# Patient Record
Sex: FEMALE | Race: WHITE | URBAN - METROPOLITAN AREA
[De-identification: names, ages, dates, MRNs, and addresses within clinical notes are randomized per-mention and may not be internally consistent; named-entity substitution may affect disease eponyms.]

---

## 2018-04-27 ENCOUNTER — HOSPITAL ENCOUNTER (EMERGENCY)
Age: 28
Discharge: HOME | End: 2018-04-27
Attending: EMERGENCY MEDICINE
Payer: MEDICAID

## 2018-04-27 VITALS
WEIGHT: 160 LBS | TEMPERATURE: 98.3 F | OXYGEN SATURATION: 100 % | BODY MASS INDEX: 25.71 KG/M2 | SYSTOLIC BLOOD PRESSURE: 97 MMHG | RESPIRATION RATE: 15 BRPM | DIASTOLIC BLOOD PRESSURE: 60 MMHG | HEIGHT: 66 IN | HEART RATE: 58 BPM

## 2018-04-27 DIAGNOSIS — R11.2 NAUSEA AND VOMITING, INTRACTABILITY OF VOMITING NOT SPECIFIED, UNSPECIFIED VOMITING TYPE: Primary | ICD-10-CM

## 2018-04-27 DIAGNOSIS — Z34.90 EARLY STAGE OF PREGNANCY: ICD-10-CM

## 2018-04-27 LAB
ALBUMIN SERPL-MCNC: 4.1 G/DL (ref 3.5–5)
ALBUMIN/GLOB SERPL: 1.1 {RATIO} (ref 1.2–3.5)
ALP SERPL-CCNC: 48 U/L (ref 50–136)
ALT SERPL-CCNC: 13 U/L (ref 12–65)
ANION GAP SERPL CALC-SCNC: 10 MMOL/L (ref 7–16)
AST SERPL-CCNC: 14 U/L (ref 15–37)
BASOPHILS # BLD: 0 K/UL (ref 0–0.2)
BASOPHILS NFR BLD: 0 % (ref 0–2)
BILIRUB SERPL-MCNC: 0.6 MG/DL (ref 0.2–1.1)
BUN SERPL-MCNC: 12 MG/DL (ref 6–23)
CALCIUM SERPL-MCNC: 9.2 MG/DL (ref 8.3–10.4)
CHLORIDE SERPL-SCNC: 99 MMOL/L (ref 98–107)
CO2 SERPL-SCNC: 25 MMOL/L (ref 21–32)
CREAT SERPL-MCNC: 0.63 MG/DL (ref 0.6–1)
DIFFERENTIAL METHOD BLD: NORMAL
EOSINOPHIL # BLD: 0.1 K/UL (ref 0–0.8)
EOSINOPHIL NFR BLD: 1 % (ref 0.5–7.8)
ERYTHROCYTE [DISTWIDTH] IN BLOOD BY AUTOMATED COUNT: 12.9 % (ref 11.9–14.6)
GLOBULIN SER CALC-MCNC: 3.9 G/DL (ref 2.3–3.5)
GLUCOSE SERPL-MCNC: 76 MG/DL (ref 65–100)
HCG UR QL: POSITIVE
HCT VFR BLD AUTO: 39.5 % (ref 35.8–46.3)
HGB BLD-MCNC: 13.4 G/DL (ref 11.7–15.4)
IMM GRANULOCYTES # BLD: 0 K/UL (ref 0–0.5)
IMM GRANULOCYTES NFR BLD AUTO: 0 % (ref 0–5)
LYMPHOCYTES # BLD: 1.7 K/UL (ref 0.5–4.6)
LYMPHOCYTES NFR BLD: 18 % (ref 13–44)
MCH RBC QN AUTO: 28.4 PG (ref 26.1–32.9)
MCHC RBC AUTO-ENTMCNC: 33.9 G/DL (ref 31.4–35)
MCV RBC AUTO: 83.7 FL (ref 79.6–97.8)
MONOCYTES # BLD: 0.5 K/UL (ref 0.1–1.3)
MONOCYTES NFR BLD: 5 % (ref 4–12)
NEUTS SEG # BLD: 7.2 K/UL (ref 1.7–8.2)
NEUTS SEG NFR BLD: 76 % (ref 43–78)
PLATELET # BLD AUTO: 233 K/UL (ref 150–450)
PMV BLD AUTO: 11 FL (ref 10.8–14.1)
POTASSIUM SERPL-SCNC: 3.7 MMOL/L (ref 3.5–5.1)
PROT SERPL-MCNC: 8 G/DL (ref 6.3–8.2)
RBC # BLD AUTO: 4.72 M/UL (ref 4.05–5.25)
SODIUM SERPL-SCNC: 134 MMOL/L (ref 136–145)
WBC # BLD AUTO: 9.4 K/UL (ref 4.3–11.1)

## 2018-04-27 PROCEDURE — 81025 URINE PREGNANCY TEST: CPT

## 2018-04-27 PROCEDURE — 80053 COMPREHEN METABOLIC PANEL: CPT | Performed by: EMERGENCY MEDICINE

## 2018-04-27 PROCEDURE — 81003 URINALYSIS AUTO W/O SCOPE: CPT | Performed by: EMERGENCY MEDICINE

## 2018-04-27 PROCEDURE — 99284 EMERGENCY DEPT VISIT MOD MDM: CPT | Performed by: EMERGENCY MEDICINE

## 2018-04-27 PROCEDURE — 96374 THER/PROPH/DIAG INJ IV PUSH: CPT | Performed by: EMERGENCY MEDICINE

## 2018-04-27 PROCEDURE — 74011250636 HC RX REV CODE- 250/636: Performed by: EMERGENCY MEDICINE

## 2018-04-27 PROCEDURE — 85025 COMPLETE CBC W/AUTO DIFF WBC: CPT | Performed by: EMERGENCY MEDICINE

## 2018-04-27 RX ORDER — SODIUM CHLORIDE 0.9 % (FLUSH) 0.9 %
5-10 SYRINGE (ML) INJECTION EVERY 8 HOURS
Status: DISCONTINUED | OUTPATIENT
Start: 2018-04-27 | End: 2018-04-28 | Stop reason: HOSPADM

## 2018-04-27 RX ORDER — ONDANSETRON 2 MG/ML
4 INJECTION INTRAMUSCULAR; INTRAVENOUS
Status: COMPLETED | OUTPATIENT
Start: 2018-04-27 | End: 2018-04-27

## 2018-04-27 RX ORDER — SODIUM CHLORIDE 9 MG/ML
1000 INJECTION, SOLUTION INTRAVENOUS ONCE
Status: COMPLETED | OUTPATIENT
Start: 2018-04-27 | End: 2018-04-27

## 2018-04-27 RX ORDER — ONDANSETRON 8 MG/1
8 TABLET, ORALLY DISINTEGRATING ORAL
Qty: 10 TAB | Refills: 0 | Status: SHIPPED | OUTPATIENT
Start: 2018-04-27 | End: 2018-05-17

## 2018-04-27 RX ORDER — SODIUM CHLORIDE 0.9 % (FLUSH) 0.9 %
5-10 SYRINGE (ML) INJECTION AS NEEDED
Status: DISCONTINUED | OUTPATIENT
Start: 2018-04-27 | End: 2018-04-28 | Stop reason: HOSPADM

## 2018-04-27 RX ADMIN — ONDANSETRON 4 MG: 2 INJECTION INTRAMUSCULAR; INTRAVENOUS at 20:10

## 2018-04-27 RX ADMIN — SODIUM CHLORIDE 1000 ML: 900 INJECTION, SOLUTION INTRAVENOUS at 20:10

## 2018-04-27 NOTE — ED TRIAGE NOTES
Patient states she is 8 weeks pregnant, and for the last 3 weeks she has been unable to keep most liquids down. States has been getting dizzy at work.

## 2018-04-28 NOTE — DISCHARGE INSTRUCTIONS
Extreme Nausea and Vomiting in Pregnancy: Care Instructions  Your Care Instructions    Nausea and vomiting (often called morning sickness) are common in pregnancy. They are caused by pregnancy hormones and happen most often in the first 3 months. Some women get very sick and are not able to keep down food and fluids. This extreme morning sickness is called hyperemesis gravidarum. It can lead to a dangerous loss of fluids in the body. It also can keep you from gaining weight and getting proper nutrition during your pregnancy. Your body fluids are put back in balance with water and minerals called electrolytes. Medicine may help if you have severe nausea and vomiting. Follow-up care is a key part of your treatment and safety. Be sure to make and go to all appointments, and call your doctor if you are having problems. It's also a good idea to know your test results and keep a list of the medicines you take. How can you care for yourself at home? · Take your medicines exactly as prescribed. Call your doctor if you think you are having a problem with your medicine. · Drink plenty of fluids to prevent dehydration. Choose water and other caffeine-free clear liquids until you feel better. Try sipping on sports drinks that have salt and sugar in them. · Eat a small snack, such as crackers, before you get out of bed. Wait a few minutes, then get out of bed slowly. · Keep food in your stomach, but not too much at once. An empty stomach can make nausea worse. Eat several small meals every day instead of three large meals. · Eat more protein and less fat. · Get plenty of vitamin B6 by eating whole grains, nuts, seeds, and legumes. You can take vitamin B6 tablets if your doctor says it is okay. · Try to avoid smells and foods that make you feel sick to your stomach. · Get lots of rest.  · You may want to try acupressure bands.  They put pressure on an acupressure point in the wrist. Some women feel better using the bands.  · Marina may also help you feel better. You can use it in tea, take it as a pill, or use a marina syrup that you can buy at a health food store. When should you call for help? Call 911 anytime you think you may need emergency care. For example, call if:  ? · You passed out (lost consciousness). ?Call your doctor now or seek immediate medical care if:  ? · You are sick to your stomach or cannot drink fluids. ? · You have symptoms of dehydration, such as:  ¨ Dry eyes and a dry mouth. ¨ Passing only a little urine. ¨ Feeling thirstier than usual.   ? · You are not able to keep down your medicines. ? · You have pain in your belly or pelvis. ? Watch closely for changes in your health, and be sure to contact your doctor if:  ? · You do not get better as expected. Where can you learn more? Go to http://evelyn-kaitlyn.info/. Enter P539 in the search box to learn more about \"Extreme Nausea and Vomiting in Pregnancy: Care Instructions. \"  Current as of: March 16, 2017  Content Version: 11.4  © 2406-7261 [employer]. Care instructions adapted under license by [employer] (which disclaims liability or warranty for this information). If you have questions about a medical condition or this instruction, always ask your healthcare professional. [place] [address] any warranty or liability for your use of this information.

## 2018-04-28 NOTE — ED NOTES
I have reviewed discharge instructions with the patient. The patient verbalized understanding. Patient left ED via Discharge Method: ambulatory to Home with spouse. Opportunity for questions and clarification provided. Patient given 1 scripts. To continue your aftercare when you leave the hospital, you may receive an automated call from our care team to check in on how you are doing. This is a free service and part of our promise to provide the best care and service to meet your aftercare needs.  If you have questions, or wish to unsubscribe from this service please call [phone]. Thank you for Choosing our [facility] Emergency Department.

## 2018-04-28 NOTE — ED NOTES
22 ga IV removed from left AC at time of discharge. Placed earlier by [facility] our tech.

## 2018-04-28 NOTE — ED PROVIDER NOTES
HPI Comments: 51-year-old white female  A0 with last menstrual period 3/2/18 presents with nausea and vomiting for the past 3 weeks. She does report some dizziness when she stands. Mild diarrhea. No vaginal bleeding or discharge. No abdominal pain. Patient is a 32 y.o. female presenting with pregnancy problem and vomiting. The history is provided by the patient. Pregnancy Problem    Associated symptoms include diarrhea, nausea and vomiting. Pertinent negatives include no fever, no dysuria, no headaches, no chest pain and no back pain. Vomiting    Associated symptoms include diarrhea. Pertinent negatives include no fever, no abdominal pain, no headaches, no cough and no headaches. Past Medical History:   Diagnosis Date    Anemia NEC        Past Surgical History:   Procedure Laterality Date    HX OTHER SURGICAL  2006    jaw surgery         History reviewed. No pertinent family history. Social History     Social History    Marital status: SINGLE     Spouse name: N/A    Number of children: N/A    Years of education: N/A     Occupational History    Not on file. Social History Main Topics    Smoking status: Never Smoker    Smokeless tobacco: Not on file    Alcohol use No    Drug use: No      Comment: hx marajuan use    Sexual activity: Yes     Partners: Male     Birth control/ protection: None     Other Topics Concern    Not on file     Social History Narrative         ALLERGIES: Codeine; Morphine; and Zyrtec [cetirizine]    Review of Systems   Constitutional: Negative for fever. HENT: Negative for congestion. Respiratory: Negative for cough and shortness of breath. Cardiovascular: Negative for chest pain. Gastrointestinal: Positive for diarrhea, nausea and vomiting. Negative for abdominal pain. Genitourinary: Negative for dysuria. Musculoskeletal: Negative for back pain and neck pain. Skin: Negative for rash. Neurological: Negative for headaches.        Vitals: 04/27/18 1916 04/27/18 1936   BP: 103/67 110/63   Pulse: 61 68   Resp: 18    Temp: 98.3 °F (36.8 °C)    SpO2: 100%    Weight: 72.6 kg (160 lb)    Height: 5' 6\" (1.676 m)             Physical Exam   Constitutional: She is oriented to person, place, and time. She appears well-developed and well-nourished. No distress. HENT:   Head: Normocephalic and atraumatic. Eyes: Conjunctivae are normal. Pupils are equal, round, and reactive to light. No scleral icterus. Neck: Normal range of motion. Neck supple. Cardiovascular: Normal rate and regular rhythm. No murmur heard. Pulmonary/Chest: Effort normal and breath sounds normal. She has no wheezes. Abdominal: Soft. She exhibits no distension. Musculoskeletal: Normal range of motion. She exhibits no edema. Neurological: She is alert and oriented to person, place, and time. Skin: Skin is warm and dry. Psychiatric: She has a normal mood and affect. Nursing note and vitals reviewed. MDM  Number of Diagnoses or Management Options  Diagnosis management comments: Blood work is unremarkable. Urinalysis has some ketones but no infection. Pregnancy is positive. Patient has been hydrated with normal saline and treated with Zofran. She is tolerating intake at this time.   She appears safe for discharge home and will be given OB/GYN referral.       Amount and/or Complexity of Data Reviewed  Clinical lab tests: ordered and reviewed  Tests in the medicine section of CPT®: ordered and reviewed    Risk of Complications, Morbidity, and/or Mortality  Presenting problems: moderate  Diagnostic procedures: moderate  Management options: moderate          ED Course       Procedures

## 2018-05-17 PROBLEM — Z34.81 MULTIGRAVIDA IN FIRST TRIMESTER: Status: ACTIVE | Noted: 2018-05-17

## 2018-05-17 PROBLEM — O34.219 PREVIOUS CESAREAN DELIVERY, ANTEPARTUM: Status: ACTIVE | Noted: 2018-05-17

## 2018-05-18 PROBLEM — O26.892 RH NEGATIVE STATUS DURING PREGNANCY IN SECOND TRIMESTER: Status: ACTIVE | Noted: 2018-05-18

## 2018-05-18 PROBLEM — Z67.91 RH NEGATIVE STATUS DURING PREGNANCY IN SECOND TRIMESTER: Status: ACTIVE | Noted: 2018-05-18

## 2018-06-02 ENCOUNTER — HOSPITAL ENCOUNTER (EMERGENCY)
Age: 28
Discharge: HOME | End: 2018-06-02
Attending: EMERGENCY MEDICINE
Payer: MEDICAID

## 2018-06-02 VITALS
HEART RATE: 64 BPM | OXYGEN SATURATION: 100 % | BODY MASS INDEX: 25.71 KG/M2 | WEIGHT: 160 LBS | SYSTOLIC BLOOD PRESSURE: 104 MMHG | HEIGHT: 66 IN | RESPIRATION RATE: 20 BRPM | TEMPERATURE: 98 F | DIASTOLIC BLOOD PRESSURE: 57 MMHG

## 2018-06-02 DIAGNOSIS — L02.211 ABSCESS OF ABDOMINAL WALL: Primary | ICD-10-CM

## 2018-06-02 PROCEDURE — 75810000289 HC I&D ABSCESS SIMP/COMP/MULT: Performed by: EMERGENCY MEDICINE

## 2018-06-02 PROCEDURE — 99283 EMERGENCY DEPT VISIT LOW MDM: CPT | Performed by: EMERGENCY MEDICINE

## 2018-06-02 RX ORDER — CEPHALEXIN 500 MG/1
500 CAPSULE ORAL 3 TIMES DAILY
Qty: 15 CAP | Refills: 0 | Status: SHIPPED | OUTPATIENT
Start: 2018-06-02 | End: 2018-07-02 | Stop reason: ALTCHOICE

## 2018-06-02 NOTE — ED PROVIDER NOTES
HPI Comments: Patient thinks she was bitten by an insect on the right lower quadrant and right inguinal region. This is a few days ago. Increasing swelling pain and redness. She is 14 weeks gestation. No abdominal pain vaginal bleeding or discharge. No vomiting or fever. Patient is a 32 y.o. female presenting with Insect Bite. The history is provided by the patient. Insect Bite   This is a new problem. The current episode started more than 2 days ago. The problem occurs constantly. The problem has been gradually worsening. Pertinent negatives include no chest pain and no abdominal pain. Nothing aggravates the symptoms. Nothing relieves the symptoms. Past Medical History:   Diagnosis Date    Anemia NEC        Past Surgical History:   Procedure Laterality Date     DELIVERY ONLY      HX BREAST AUGMENTATION      HX OTHER SURGICAL  2006    jaw surgery         Family History:   Problem Relation Age of Onset    No Known Problems Father     No Known Problems Mother        Social History     Social History    Marital status:      Spouse name: N/A    Number of children: N/A    Years of education: N/A     Occupational History    Not on file. Social History Main Topics    Smoking status: Never Smoker    Smokeless tobacco: Never Used    Alcohol use No    Drug use: No      Comment: hx marajuan use    Sexual activity: Yes     Partners: Male     Birth control/ protection: None     Other Topics Concern    Caffeine Concern No    Exercise No    Seat Belt Yes    Self-Exams No     Social History Narrative    Abuse: Feels safe at home, no history of physical abuse, no history of sexual abuse             ALLERGIES: Codeine; Morphine; and Zyrtec [cetirizine]    Review of Systems   Constitutional: Negative for chills and fever. Cardiovascular: Negative for chest pain. Gastrointestinal: Negative for abdominal pain and vomiting.        Vitals:    18 0026 18 0331 18 0332   BP: 109/58  104/57   Pulse: 75 64    Resp: 20     Temp: 98 °F (36.7 °C)     SpO2: 99% 100%    Weight: 72.6 kg (160 lb)     Height: 5' 6\" (1.676 m)              Physical Exam   Constitutional: She appears well-developed and well-nourished. No distress. Abdominal: Soft. There is no tenderness. Skin:        Nursing note and vitals reviewed. MDM  Number of Diagnoses or Management Options  Abscess of abdominal wall:   Diagnosis management comments: No imaging needed    Risk of Complications, Morbidity, and/or Mortality  Presenting problems: low  Diagnostic procedures: minimal          ED Course       I&D Abcess Simple  Date/Time: 6/2/2018 6:26 AM  Performed by: Kezia Archuleta  Authorized by: Kezia Archuleta     Location:     Type:  Abscess    Location:  Trunk    Trunk location:  Abdomen  Pre-procedure details:     Skin preparation:  Betadine  Anesthesia (see MAR for exact dosages): Anesthesia method:  Local infiltration    Local anesthetic:  Lidocaine 1% w/o epi  Procedure type:     Complexity:  Simple  Procedure details:     Needle aspiration: yes      Incision types:  Single straight    Incision depth:  Subcutaneous    Scalpel blade:  11    Wound management:  Probed and deloculated    Drainage:  Purulent    Drainage amount: Moderate    Wound treatment:  Wound left open    Packing materials:  None  Post-procedure details:     Patient tolerance of procedure:   Tolerated well, no immediate complications

## 2018-06-02 NOTE — ED NOTES
I have reviewed discharge instructions with the patient. The patient verbalized understanding. Patient left ED via Discharge Method: ambulatory to Home with family. Opportunity for questions and clarification provided. Patient given 1 script. To continue your aftercare when you leave the hospital, you may receive an automated call from our care team to check in on how you are doing. This is a free service and part of our promise to provide the best care and service to meet your aftercare needs.  If you have questions, or wish to unsubscribe from this service please call [phone]. Thank you for Choosing our [facility] Emergency Department.

## 2018-06-02 NOTE — DISCHARGE INSTRUCTIONS
Change dressing if becoming soaked or dirty. Clean gently with soap and water and then antibiotic ointment and gauze. Take antibiotic until complete. Warm compresses. If not improved, please recheck on Monday. Skin Abscess: Care Instructions  Your Care Instructions    A skin abscess is a bacterial infection that forms a pocket of pus. A boil is a kind of skin abscess. The doctor may have cut an opening in the abscess so that the pus can drain out. You may have gauze in the cut so that the abscess will stay open and keep draining. You may need antibiotics. You will need to follow up with your doctor to make sure the infection has gone away. The doctor has checked you carefully, but problems can develop later. If you notice any problems or new symptoms, get medical treatment right away. Follow-up care is a key part of your treatment and safety. Be sure to make and go to all appointments, and call your doctor if you are having problems. It's also a good idea to know your test results and keep a list of the medicines you take. How can you care for yourself at home? · Apply warm and dry compresses, a heating pad set on low, or a hot water bottle 3 or 4 times a day for pain. Keep a cloth between the heat source and your skin. · If your doctor prescribed antibiotics, take them as directed. Do not stop taking them just because you feel better. You need to take the full course of antibiotics. · Take pain medicines exactly as directed. ¨ If the doctor gave you a prescription medicine for pain, take it as prescribed. ¨ If you are not taking a prescription pain medicine, ask your doctor if you can take an over-the-counter medicine. · Keep your bandage clean and dry. Change the bandage whenever it gets wet or dirty, or at least one time a day. · If the abscess was packed with gauze:  ¨ Keep follow-up appointments to have the gauze changed or removed.  If the doctor instructed you to remove the gauze, gently pull out all of the gauze when your doctor tells you to. ¨ After the gauze is removed, soak the area in warm water for 15 to 20 minutes 2 times a day, until the wound closes. When should you call for help? Call your doctor now or seek immediate medical care if:  ? · You have signs of worsening infection, such as:  ¨ Increased pain, swelling, warmth, or redness. ¨ Red streaks leading from the infected skin. ¨ Pus draining from the wound. ¨ A fever. ? Watch closely for changes in your health, and be sure to contact your doctor if:  ? · You do not get better as expected. Where can you learn more? Go to http://evelyn-kaitlyn.info/. Enter I714 in the search box to learn more about \"Skin Abscess: Care Instructions. \"  Current as of: October 13, 2016  Content Version: 11.4  © 6652-2144 [employer]. Care instructions adapted under license by [employer] (which disclaims liability or warranty for this information). If you have questions about a medical condition or this instruction, always ask your healthcare professional. [place] [address] any warranty or liability for your use of this information.

## 2018-06-02 NOTE — ED TRIAGE NOTES
Pt states that she has a spider bite to the right lower abd area with redness and swelling.   Pt is approx 14 weeks pregnant

## 2018-06-07 PROBLEM — Z34.82 MULTIGRAVIDA IN SECOND TRIMESTER: Status: ACTIVE | Noted: 2018-05-17
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
## 2018-06-19 PROBLEM — O28.5 ABNORMAL GENETIC TEST IN PREGNANCY: Status: ACTIVE | Noted: 2018-06-19

## 2018-09-11 PROBLEM — Z34.83 MULTIGRAVIDA IN THIRD TRIMESTER: Status: ACTIVE | Noted: 2018-05-17

## 2018-09-11 PROBLEM — O26.893 RH NEGATIVE STATUS DURING PREGNANCY IN THIRD TRIMESTER: Status: ACTIVE | Noted: 2018-05-18

## 2018-09-12 PROBLEM — O99.013 ANEMIA IN PREGNANCY, THIRD TRIMESTER: Status: ACTIVE | Noted: 2018-09-12

## 2018-09-21 ENCOUNTER — HOSPITAL ENCOUNTER (EMERGENCY)
Age: 28
Discharge: HOME | End: 2018-09-21
Attending: OBSTETRICS & GYNECOLOGY | Admitting: OBSTETRICS & GYNECOLOGY
Payer: MEDICAID

## 2018-09-21 VITALS
TEMPERATURE: 98.4 F | WEIGHT: 167 LBS | HEART RATE: 90 BPM | DIASTOLIC BLOOD PRESSURE: 56 MMHG | HEIGHT: 65 IN | BODY MASS INDEX: 27.82 KG/M2 | SYSTOLIC BLOOD PRESSURE: 105 MMHG

## 2018-09-21 PROBLEM — O21.9 NAUSEA AND VOMITING OF PREGNANCY, ANTEPARTUM: Status: ACTIVE | Noted: 2018-09-21

## 2018-09-21 PROCEDURE — 74011250636 HC RX REV CODE- 250/636: Performed by: OBSTETRICS & GYNECOLOGY

## 2018-09-21 PROCEDURE — 96360 HYDRATION IV INFUSION INIT: CPT

## 2018-09-21 PROCEDURE — 96374 THER/PROPH/DIAG INJ IV PUSH: CPT

## 2018-09-21 PROCEDURE — 99282 EMERGENCY DEPT VISIT SF MDM: CPT

## 2018-09-21 PROCEDURE — 59025 FETAL NON-STRESS TEST: CPT

## 2018-09-21 RX ORDER — SODIUM CHLORIDE, SODIUM LACTATE, POTASSIUM CHLORIDE, CALCIUM CHLORIDE 600; 310; 30; 20 MG/100ML; MG/100ML; MG/100ML; MG/100ML
999 INJECTION, SOLUTION INTRAVENOUS CONTINUOUS
Status: DISCONTINUED | OUTPATIENT
Start: 2018-09-21 | End: 2018-09-21 | Stop reason: HOSPADM

## 2018-09-21 RX ORDER — ONDANSETRON 2 MG/ML
4 INJECTION INTRAMUSCULAR; INTRAVENOUS ONCE
Status: COMPLETED | OUTPATIENT
Start: 2018-09-21 | End: 2018-09-21

## 2018-09-21 RX ADMIN — ONDANSETRON 4 MG: 2 INJECTION, SOLUTION INTRAMUSCULAR; INTRAVENOUS at 17:47

## 2018-09-21 RX ADMIN — SODIUM CHLORIDE, SODIUM LACTATE, POTASSIUM CHLORIDE, AND CALCIUM CHLORIDE 999 ML/HR: 600; 310; 30; 20 INJECTION, SOLUTION INTRAVENOUS at 17:45

## 2018-09-21 NOTE — IP AVS SNAPSHOT
[address] 
[phone] Patient: Toro Garcia MRN: [id number] YIX:7/95/1921 About your hospitalization You were admitted on:  N/A You last received care in the:  [facility] 4 [facility] You were discharged on:  September 21, 2018 Why you were hospitalized Your primary diagnosis was:  Not on File Your diagnoses also included:  Nausea And Vomiting Of Pregnancy, Antepartum Follow-up Information Follow up With Details Comments Contact Info Corazon Mendosa MD   Patient can only remember the practice name and not the physician Your Scheduled Appointments Wednesday September 26, 2018  2:00 PM EDT  
EST OB with Elena Tinoco MD, [address]/[facility] ([facility] [place] [place] [facility]) [address] [address]  
[phone] Discharge Orders None A check michelle indicates which time of day the medication should be taken. My Medications ASK your doctor about these medications Instructions Each Dose to Equal  
 Morning Noon Evening Bedtime  
 doxylamine succinate 25 mg tablet Commonly known as:  Jordmitchell Greek Your last dose was: Your next dose is: Take 1 Tab by mouth two (2) times daily as needed (nausea/vomiting). 25 mg  
    
   
   
   
  
 prenatal vit-iron fumarate-fa 27 mg iron- 0.8 mg Tab tablet Your last dose was: Your next dose is: Take 1 Tab by mouth daily. 1 Tab  
    
   
   
   
  
 pyridoxine (vitamin B6) 50 mg tablet Commonly known as:  VITAMIN B-6 Your last dose was: Your next dose is: Take 1 Tab by mouth three (3) times daily as needed. 50 mg Discharge Instructions Extreme Nausea and Vomiting in Pregnancy: Care Instructions Your Care Instructions Nausea and vomiting (often called morning sickness) are common in pregnancy. They are caused by pregnancy hormones and happen most often in the first 3 months. Some women get very sick and are not able to keep down food and fluids. This extreme morning sickness is called hyperemesis gravidarum. It can lead to a dangerous loss of fluids in the body. It also can keep you from gaining weight and getting proper nutrition during your pregnancy. Your body fluids are put back in balance with water and minerals called electrolytes. Medicine may help if you have severe nausea and vomiting. Follow-up care is a key part of your treatment and safety. Be sure to make and go to all appointments, and call your doctor if you are having problems. It's also a good idea to know your test results and keep a list of the medicines you take. How can you care for yourself at home? · Take your medicines exactly as prescribed. Call your doctor if you think you are having a problem with your medicine. · Drink plenty of fluids to prevent dehydration. Choose water and other caffeine-free clear liquids until you feel better. Try sipping on sports drinks that have salt and sugar in them. · Eat a small snack, such as crackers, before you get out of bed. Wait a few minutes, then get out of bed slowly. · Keep food in your stomach, but not too much at once. An empty stomach can make nausea worse. Eat several small meals every day instead of three large meals. · Eat more protein and less fat. · Get plenty of vitamin B6 by eating whole grains, nuts, seeds, and legumes. You can take vitamin B6 tablets if your doctor says it is okay. · Try to avoid smells and foods that make you feel sick to your stomach. · Get lots of rest. 
· You may want to try acupressure bands. They put pressure on an acupressure point in the wrist. Some women feel better using the bands. · Marina may also help you feel better.  You can use it in tea, take it as a pill, or use a muna syrup that you can buy at a health food store. When should you call for help? Call 911 anytime you think you may need emergency care. For example, call if: 
  · You passed out (lost consciousness).  
 Call your doctor now or seek immediate medical care if: 
  · You are sick to your stomach or cannot drink fluids.  
  · You have symptoms of dehydration, such as: ¨ Dry eyes and a dry mouth. ¨ Passing only a little urine. ¨ Feeling thirstier than usual.  
  · You are not able to keep down your medicines.  
  · You have pain in your belly or pelvis.  
 Watch closely for changes in your health, and be sure to contact your doctor if: 
  · You do not get better as expected. Where can you learn more? Go to http://evelyn-kaitlyn.info/. Enter B580 in the search box to learn more about \"Extreme Nausea and Vomiting in Pregnancy: Care Instructions. \" Current as of: November 21, 2017 Content Version: 11.7 © 2236-5086 Healthwise, Incorporated. Care instructions adapted under license by [employer] (which disclaims liability or warranty for this information). If you have questions about a medical condition or this instruction, always ask your healthcare professional. [place] [address] any warranty or liability for your use of this information. Introducing [facility] [place] [facility]! [employer] introduces [employer] patient portal. Now you can access parts of your medical record, email your doctor's office, and request medication refills online. 1. In your internet browser, go to [email or web address] 2. Click on the First Time User? Click Here link in the Sign In box. You will see the New Member Sign Up page. 3. Enter your [employer] Access Code exactly as it appears below. You will not need to use this code after youve completed the sign-up process. If you do not sign up before the expiration date, you must request a new code. · [email or web address] Access Code: [email or web address] Expires: 10/29/2018  8:51 AM 
 
4. Enter the last four digits of your Social Security Number (xxxx) and Date of Birth (mm/dd/yyyy) as indicated and click Submit. You will be taken to the next sign-up page. 5. Create a [email or web address] ID. This will be your [email or web address] login ID and cannot be changed, so think of one that is secure and easy to remember. 6. Create a [employer] password. You can change your password at any time. 7. Enter your Password Reset Question and Answer. This can be used at a later time if you forget your password. 8. Enter your e-mail address. You will receive e-mail notification when new information is available in [address] 9. Click Sign Up. You can now view and download portions of your medical record. 10. Click the Download Summary menu link to download a portable copy of your medical information. If you have questions, please visit the Frequently Asked Questions section of the [email or web address] website. Remember, [email or web address] is NOT to be used for urgent needs. For medical emergencies, dial 911. Now available from your iPhone and Android! Introducing Saúl Dominguez As a [facility] patient, I wanted to make you aware of our electronic visit tool called Saúl Jamesruslan. [facility] 24/7 allows you to connect within minutes with a medical provider 24 hours a day, seven days a week via a mobile device or tablet or logging into a secure website from your computer. You can access Saúl Angelica from anywhere in the [place]. A virtual visit might be right for you when you have a simple condition and feel like you just dont want to get out of bed, or cant get away from work for an appointment, when your regular [facility]ton[facility] provider is not available (evenings, weekends or holidays), or when youre out of town and need minor care.   Electronic visits cost only $49 and if the [facility] 24/7 provider determines a prescription is needed to treat your condition, one can be electronically transmitted to a nearby pharmacy*. Please take a moment to enroll today if you have not already done so. The enrollment process is free and takes just a few minutes. To enroll, please download the [facility] 24/7 suzette to your tablet or phone, or visit [email or web address] to enroll on your computer. And, as an [address] [place] [address] patient with a [employer] account, the results of your visits will be scanned into your electronic medical record and your primary care provider will be able to view the scanned results. We urge you to continue to see your regular [facility] Sousa provider for your ongoing medical care. And while your primary care provider may not be the one available when you seek a [employer] virtual visit, the peace of mind you get from getting a real diagnosis real time can be priceless. For more information on Ok[employer], view our Frequently Asked Questions (FAQs) at [email or web address]. Sincerely, 
 
Nya Arcos MD 
Chief Medical Officer [facility] Nori Vazquez *:  certain medications cannot be prescribed via Ok[employer] Providers Seen During Your Hospitalization Provider Specialty Primary office phone Rodrigue Watts MD Obstetrics & Gynecology [phone] Your Primary Care Physician (PCP) Primary Care Physician Office Phone Office Fax OTHER, PHYS ** None ** ** None ** You are allergic to the following Allergen Reactions Codeine Nausea and Vomiting Morphine Nausea and Vomiting Zyrtec (Cetirizine) Rash Recent Documentation Height Weight BMI OB Status Smoking Status 1.651 m 75.8 kg 27.79 kg/m2 Pregnant Never Smoker Emergency Contacts Name Discharge Info Relation Home Work Mobile Rik Fournier  Other Relative [6] [phone] Patient Belongings The following personal items are in your possession at time of discharge: 
                             
 
  
  
 Please provide this summary of care documentation to your next provider. Signatures-by signing, you are acknowledging that this After Visit Summary has been reviewed with you and you have received a copy. Patient Signature:  ____________________________________________________________ Date:  ____________________________________________________________  
  
[facility] Provider Signature:  ____________________________________________________________ Date:  ____________________________________________________________

## 2018-09-21 NOTE — ED PROVIDER NOTES
Chief Complaint: 
 
 
29 y.o. female at 30w2d 
weeks gestation who is seen for nausea and vomiting since yesterday. Felt 'hot\" yesterday, not now. Has not taken anything for sx. Pregnancy complicated by previous c/s, planning repeat, also pos serum screen for DS with NIPT neg x 3. HISTORY: 
 
History Sexual Activity  Sexual activity: Yes  Partners: Male  Birth control/ protection: None Patient's last menstrual period was 03/02/2018. Social History Social History  Marital status:  Spouse name: N/A  
 Number of children: N/A  
 Years of education: N/A Occupational History  Not on file. Social History Main Topics  Smoking status: Never Smoker  Smokeless tobacco: Never Used  Alcohol use No  
 Drug use: No  
   Comment: hx marajuan use  Sexual activity: Yes  
  Partners: Male Birth control/ protection: None Other Topics Concern  Caffeine Concern No  
 Exercise No  
 Seat Belt Yes  Self-Exams No  
 
Social History Narrative Abuse: Feels safe at home, no history of physical abuse, no history of sexual abuse Past Surgical History:  
Procedure Laterality Date  BREAST SURGERY PROCEDURE UNLISTED [facility] 812 ONLY  2012 LTCS   
 HX BREAST AUGMENTATION    
 HX OTHER SURGICAL  6/2006  
 jaw surgery Past Medical History:  
Diagnosis Date  Anemia NEC   
 
 
 
ROS: 
A 12 point review of symptoms negative except for chief complaint as described above. PHYSICAL EXAM: 
Blood pressure 105/56, pulse 90, temperature 98.4 °F (36.9 °C), height 5' 5\" (1.651 m), weight 75.8 kg (167 lb), last menstrual period 03/02/2018. Constitutional: The patient appears well, alert, oriented x 3. Cardiovascular: Heart RRR, no murmurs. Respiratory: Lungs clear, no respiratory distress GI: Abdomen soft, nontender, no guarding No fundal tenderness Musculoskeletal: no cva tenderness Upper ext: no edema, reflexes +2 
 Lower ext: no edema, neg vince's, reflexes +2 Skin: no rashes or lesions Psychiatric:Mood/ Affect: appropriate Genitourinary: SVE: deferred FHT: 150's cat 1 
TOCO: no contractions Urine w 3+ ketones I personally reviewed pt's medical record including relevant labs and ultrasounds Assessment/Plan: Nausea/vomiting, likely viral.  IV hydration, zofran w improved sx. Pt feeling much better, stable for home, reassuring fetal status. Discharge home, follow up in office next week. Discussed indications for return.

## 2018-09-21 NOTE — PROGRESS NOTES
Pt presents to [place] ED complaining of N&V and chills that have persisted for 2 days. Denies SROM or vaginal bleeding. Dr. Don Boone notified of pt's arrival on unit and will be by shortly to evaluate.

## 2018-09-21 NOTE — DISCHARGE INSTRUCTIONS
Extreme Nausea and Vomiting in Pregnancy: Care Instructions  Your Care Instructions    Nausea and vomiting (often called morning sickness) are common in pregnancy. They are caused by pregnancy hormones and happen most often in the first 3 months. Some women get very sick and are not able to keep down food and fluids. This extreme morning sickness is called hyperemesis gravidarum. It can lead to a dangerous loss of fluids in the body. It also can keep you from gaining weight and getting proper nutrition during your pregnancy. Your body fluids are put back in balance with water and minerals called electrolytes. Medicine may help if you have severe nausea and vomiting. Follow-up care is a key part of your treatment and safety. Be sure to make and go to all appointments, and call your doctor if you are having problems. It's also a good idea to know your test results and keep a list of the medicines you take. How can you care for yourself at home? · Take your medicines exactly as prescribed. Call your doctor if you think you are having a problem with your medicine. · Drink plenty of fluids to prevent dehydration. Choose water and other caffeine-free clear liquids until you feel better. Try sipping on sports drinks that have salt and sugar in them. · Eat a small snack, such as crackers, before you get out of bed. Wait a few minutes, then get out of bed slowly. · Keep food in your stomach, but not too much at once. An empty stomach can make nausea worse. Eat several small meals every day instead of three large meals. · Eat more protein and less fat. · Get plenty of vitamin B6 by eating whole grains, nuts, seeds, and legumes. You can take vitamin B6 tablets if your doctor says it is okay. · Try to avoid smells and foods that make you feel sick to your stomach. · Get lots of rest.  · You may want to try acupressure bands.  They put pressure on an acupressure point in the wrist. Some women feel better using the bands.  · Marina may also help you feel better. You can use it in tea, take it as a pill, or use a marina syrup that you can buy at a health food store. When should you call for help? Call 911 anytime you think you may need emergency care. For example, call if:    · You passed out (lost consciousness).    Call your doctor now or seek immediate medical care if:    · You are sick to your stomach or cannot drink fluids.     · You have symptoms of dehydration, such as:  ¨ Dry eyes and a dry mouth. ¨ Passing only a little urine. ¨ Feeling thirstier than usual.     · You are not able to keep down your medicines.     · You have pain in your belly or pelvis.    Watch closely for changes in your health, and be sure to contact your doctor if:    · You do not get better as expected. Where can you learn more? Go to http://evelyn-kaitlyn.info/. Enter F470 in the search box to learn more about \"Extreme Nausea and Vomiting in Pregnancy: Care Instructions. \"  Current as of: November 21, 2017  Content Version: 11.7  © 5401-2972 [employer], Incorporated. Care instructions adapted under license by [employer] (which disclaims liability or warranty for this information). If you have questions about a medical condition or this instruction, always ask your healthcare professional. [address] any warranty or liability for your use of this information.

## 2018-09-21 NOTE — IP AVS SNAPSHOT
[facility] 
 
 
 [address] [id number] [facility] 
[phone] Patient: Miko Ackerman MRN: [id number] BTE:[birth year] A check michelle indicates which time of day the medication should be taken. My Medications ASK your doctor about these medications Instructions Each Dose to Equal  
 Morning Noon Evening Bedtime  
 doxylamine succinate 25 mg tablet Commonly known as:  Elizabethann Standard Your last dose was: Your next dose is: Take 1 Tab by mouth two (2) times daily as needed (nausea/vomiting). 25 mg  
    
   
   
   
  
 prenatal vit-iron fumarate-fa 27 mg iron- 0.8 mg Tab tablet Your last dose was: Your next dose is: Take 1 Tab by mouth daily. 1 Tab  
    
   
   
   
  
 pyridoxine (vitamin B6) 50 mg tablet Commonly known as:  VITAMIN B-6 Your last dose was: Your next dose is: Take 1 Tab by mouth three (3) times daily as needed.   
 50 mg

## 2018-09-21 NOTE — PROGRESS NOTES
Dr. Maxi Felton notified- pt able to tolerate drinking water and eating saltine cracker. MD will evaluate pt.

## 2018-09-21 NOTE — PROGRESS NOTES
Pt given discharge instructions- all questions answered and pt verbalizes understanding. Pt ambulated off unit in stable condition accompanied by .

## 2018-09-21 NOTE — IP AVS SNAPSHOT
Summary of Care Report The Summary of Care report has been created to help improve care coordination. Users with access to [employer] or [address] (Web-based application) may access additional patient information including the Discharge Summary. If you are not currently a [address] user and need more information, please call the number listed below in the [place] section and ask to be connected with Medical Records. Facility Information Name Address Phone [address] [facility] [address] [phone] Patient Information Patient Name Sex AARON Wilson ([id number]) Female 1990 Discharge Information Admitting Provider Service Area Unit Kae Sharpe MD / [address] [facility] 4 [place] / [phone] Discharge Provider Discharge Date/Time Discharge Disposition Destination (none) 2018 18:39 (Pending) AHR (none) Patient Language Language ENGLISH [13] Hospital Problems as of 2018  Reviewed: 2018  4:18 PM by Mark Castro MD  
  
  
  
 Class Noted - Resolved Last Modified POA Active Problems Nausea and vomiting of pregnancy, antepartum  2018 - Present 2018 by Kae Sharpe MD Unknown Entered by Kae Sharpe MD  
  
Non-Hospital Problems as of 2018  Reviewed: 2018  4:18 PM by Mark Castro MD  
  
  
  
 Class Noted - Resolved Last Modified Active Problems Multigravida in third trimester  2018 - Present 2018 by Corazon Vizcarra MD  
  Entered by Corazon Vizcarra MD  
  Overview Signed 2018 10:54 AM by Corazon Vizcarra MD  
   EDC by 12  week US not C/W LMP   Previous  delivery, antepartum  2018 - Present 2018 by Mark Castro MD  
  Entered by Corazon Vizcarra MD  
 Overview Addendum 2018 10:59 AM by Aarti Mcfarland MD  
   D/W pt risks of , including but not limited to: risk of uterine rupture during labor of approximately 1% with a subsequent 40-50% risk of  neurological injury and or death. D/W pt operative risks of repeat C/S including but not limited to death, bleeding, infection, damage to other internal organs and other risks involving future pregnancies and mode of delivery. Also, D/W pt increase in maternal and fetal morbidity after failed MAURI. Pt desires repeat when indicated Rh negative status during pregnancy in third trimester  2018 - Present 2018 by Aarti Mcfarland MD  
  Entered by Aarti Mcfarland MD  
  Overview Addendum 2018 10:51 AM by Aarti Mcfarland MD  
   Rhogam at 28 weeks and PP if indicated 18:  rhogam given Abnormal genetic test in pregnancy  2018 - Present 2018 by Aarti Mcfarland MD  
  Entered by Aarti Mcfarland MD  
  Overview Addendum 2018  9:53 AM by Aarti Mcfarland MD  
   (+) Quad screen for [facility] NIPT neg x 3, female 2018 at [facility]:  Normal Anatomy/Fetal Echo, AC 18%. Patient reassured, no follow up recommended. Anemia in pregnancy, third trimester  2018 - Present 2018 by Aarti Mcfarland MD  
  Entered by Aarti Mcfarland MD  
  Overview Signed 2018  8:19 AM by Aarti Mcfarland MD  
   Additional Fe, colace You are allergic to the following Allergen Reactions Codeine Nausea and Vomiting Morphine Nausea and Vomiting Zyrtec (Cetirizine) Rash Current Discharge Medication List  
  
ASK your doctor about these medications Dose & Instructions Dispensing Information Comments  
 doxylamine succinate 25 mg tablet Commonly known as:  Lorri Yue Dose:  25 mg Take 1 Tab by mouth two (2) times daily as needed (nausea/vomiting). Quantity:  60 Tab Refills:  2  
   
 prenatal vit-iron fumarate-fa 27 mg iron- 0.8 mg Tab tablet Dose:  1 Tab Take 1 Tab by mouth daily. Quantity:  90 Tab Refills:  3  
   
 pyridoxine (vitamin B6) 50 mg tablet Commonly known as:  VITAMIN B-6 Dose:  50 mg Take 1 Tab by mouth three (3) times daily as needed. Quantity:  90 Tab Refills:  2 Current Immunizations Name Date [address] INJECTION 11/17/2012 Follow-up Information Follow up With Details Comments Contact Info Corazon Mendosa MD   Patient can only remember the practice name and not the physician Discharge Instructions Extreme Nausea and Vomiting in Pregnancy: Care Instructions Your Care Instructions Nausea and vomiting (often called morning sickness) are common in pregnancy. They are caused by pregnancy hormones and happen most often in the first 3 months. Some women get very sick and are not able to keep down food and fluids. This extreme morning sickness is called hyperemesis gravidarum. It can lead to a dangerous loss of fluids in the body. It also can keep you from gaining weight and getting proper nutrition during your pregnancy. Your body fluids are put back in balance with water and minerals called electrolytes. Medicine may help if you have severe nausea and vomiting. Follow-up care is a key part of your treatment and safety. Be sure to make and go to all appointments, and call your doctor if you are having problems. It's also a good idea to know your test results and keep a list of the medicines you take. How can you care for yourself at home? · Take your medicines exactly as prescribed. Call your doctor if you think you are having a problem with your medicine. · Drink plenty of fluids to prevent dehydration. Choose water and other caffeine-free clear liquids until you feel better. Try sipping on sports drinks that have salt and sugar in them. · Eat a small snack, such as crackers, before you get out of bed. Wait a few minutes, then get out of bed slowly. · Keep food in your stomach, but not too much at once. An empty stomach can make nausea worse. Eat several small meals every day instead of three large meals. · Eat more protein and less fat. · Get plenty of vitamin B6 by eating whole grains, nuts, seeds, and legumes. You can take vitamin B6 tablets if your doctor says it is okay. · Try to avoid smells and foods that make you feel sick to your stomach. · Get lots of rest. 
· You may want to try acupressure bands. They put pressure on an acupressure point in the wrist. Some women feel better using the bands. · Marina may also help you feel better. You can use it in tea, take it as a pill, or use a marina syrup that you can buy at a health food store. When should you call for help? Call 911 anytime you think you may need emergency care. For example, call if: 
  · You passed out (lost consciousness).  
 Call your doctor now or seek immediate medical care if: 
  · You are sick to your stomach or cannot drink fluids.  
  · You have symptoms of dehydration, such as: ¨ Dry eyes and a dry mouth. ¨ Passing only a little urine. ¨ Feeling thirstier than usual.  
  · You are not able to keep down your medicines.  
  · You have pain in your belly or pelvis.  
 Watch closely for changes in your health, and be sure to contact your doctor if: 
  · You do not get better as expected. Where can you learn more? Go to http://evelyn-kaitlyn.info/. Enter J211 in the search box to learn more about \"Extreme Nausea and Vomiting in Pregnancy: Care Instructions. \" Current as of: November 21, 2017 Content Version: 11.7 © 5793-1296 [employer], [employer]. Care instructions adapted under license by [employer] (which disclaims liability or warranty for this information).  If you have questions about a medical condition or this instruction, always ask your healthcare professional. [place] [address] any warranty or liability for your use of this information. Chart Review Routing History No Routing History on File

## 2018-09-26 PROBLEM — O21.9 NAUSEA AND VOMITING OF PREGNANCY, ANTEPARTUM: Status: RESOLVED | Noted: 2018-09-21 | Resolved: 2018-09-26

## 2018-10-10 ENCOUNTER — HOSPITAL ENCOUNTER (OUTPATIENT)
Dept: LAB | Age: 28
Discharge: HOME | End: 2018-10-10

## 2018-10-10 PROBLEM — L98.9 SKIN LESION OF BACK: Status: ACTIVE | Noted: 2018-10-10

## 2018-10-10 PROCEDURE — 88305 TISSUE EXAM BY PATHOLOGIST: CPT

## 2018-11-15 NOTE — H&P
Willi Alvar[facility] OB H&P Assessment/Plan Problem List  Date Reviewed: 2018 Codes Class Skin lesion of back ICD-10-CM: L98.9 ICD-9-CM: 709.9 Overview Addendum 10/30/2018  4:17 PM by Jennyfer Ibrahim MD  
  approx 4-5 mm pedunculated, raised lesion on right scapula PATH:  GRANULOMA PYOGENICUM Anemia in pregnancy, third trimester ICD-10-CM: O99.013 ICD-9-CM: 467.71 Overview Signed 2018  8:19 AM by Jennyfer Ibrahim MD  
  Additional Fe, colace Abnormal genetic test in pregnancy ICD-10-CM: O28.5 ICD-9-CM: 796.5 Overview Addendum 2018  2:45 PM by Jennyfer Ibrahim MD  
  (+) Quad screen for [employer] NIPT neg x 3, female 2018 at [facility]:  Normal Anatomy/Fetal Echo, AC 18%. Patient reassured, no follow up recommended. 2018 at [facility]:  EFW 45%, AC 20%, HUNTER 15.3 cm Rh negative status during pregnancy in third trimester ICD-10-CM: O09.893, Z67.91 
ICD-9-CM: V23.89 Overview Addendum 2018 10:51 AM by Jennyfer Ibrahim MD  
  Rhogam at 28 weeks and PP if indicated 18:  rhogam given Multigravida in third trimester ICD-10-CM: Z34.83 ICD-9-CM: V22.1 Overview Signed 2018 10:54 AM by Jennyfer Ibrahim MD  
  [facility] US not C/W LMP Previous  delivery, antepartum ICD-10-CM: O34.219 ICD-9-CM: 052.16 Overview Addendum 10/30/2018  4:15 PM by Jennyfer Ibrahim MD  
  D/W pt risks of , including but not limited to: risk of uterine rupture during labor of approximately 1% with a subsequent 40-50% risk of  neurological injury and or death. D/W pt operative risks of repeat C/S including but not limited to death, bleeding, infection, damage to other internal organs and other risks involving future pregnancies and mode of delivery. Also, D/W pt increase in maternal and fetal morbidity after failed MAURI.   
 
Repeat C/S scheduled  @ 11:30 am 
 Subjective [address] [facility] 29 y.o.  39w0d  presented to L&D for repeat C/S. Pt has no complaints today. Pt denies vaginal bleeding/discharge. No LOF.  +FM. D/W pt at length risks/benefits of procedure including but not limited to death, bleeding, infection and damage to other internal organs. She exhibited full understanding and wishes to proceed. OB History  Para Term  AB Living 3 1 1   1 1 SAB TAB Ectopic Molar Multiple Live Births 1  
  
# Outcome Date GA Lbr Jesus/2nd Weight Sex Delivery Anes PTL Lv  
3 Current 2 AB 2017 5w0d 1 Term 11/15/12 40w2d  7 lb 11.1 oz (3.49 kg) F  LO SPINAL AN  LEEROY Complications: Failure to Progress in Second Stage Past Medical History:  
Diagnosis Date  Anemia NEC Past Surgical History:  
Procedure Laterality Date  BREAST SURGERY PROCEDURE UNLISTED Mem[address]12 ONLY   LTCS   
 HX BREAST AUGMENTATION    
 HX OTHER SURGICAL  2006  
 jaw surgery Family History Problem Relation Age of Onset  No Known Problems Father  No Known Problems Mother  Breast Cancer Neg Hx   
 Ovarian Cancer Neg Hx  Uterine Cancer Neg Hx  Colon Cancer Neg Hx Social History Socioeconomic History  Marital status:  Spouse name: Not on file  Number of children: Not on file  Years of education: Not on file  Highest education level: Not on file Social Needs  Financial resource strain: Not on file  Food insecurity - worry: Not on file  Food insecurity - inability: Not on file  Transportation needs - medical: Not on file  Transportation needs - non-medical: Not on file Occupational History  Not on file Tobacco Use  Smoking status: Never Smoker  Smokeless tobacco: Never Used Substance and Sexual Activity  Alcohol use: No  
 Drug use: No  
  Comment: hx marajuan use  Sexual activity: Yes  
  Partners: Male Birth control/protection: None Other Topics Concern [address] Service Not Asked  Blood Transfusions Not Asked  Caffeine Concern No  
 Occupational Exposure Not Asked Giancarlo Layla Hazards Not Asked  Sleep Concern Not Asked  Stress Concern Not Asked  Weight Concern Not Asked  Special Diet Not Asked  Back Care Not Asked  Exercise No  
 Bike Helmet Not Asked [address] Yes  Self-Exams No  
Social History Narrative Abuse: Feels safe at home, no history of physical abuse, no history of sexual abuse Allergies Allergen Reactions  Codeine Nausea and Vomiting  Morphine Nausea and Vomiting  Zyrtec [Cetirizine] Rash Review of Systems: 
 
Constitutional: No fevers or chills Prenatal: + fetal movement, no VB/DC, no LOF  
 
CV: No chest pain or palpatations Resp: No SOB or cough GI: No nausea/vomiting/diarrhea/constipation Neuro: No HA, no seizure like activity Skin: No rashes or lesions Breast: No breast pain : No dysuria or hematuria Prenatal Record Review The prenatal record has been reviewed. Prenatal Labs:  
Lab Results Component Value Date/Time  
 Rubella, External Immune 04/04/2012 GrBStrep, External negative 10/12/2012 HBsAg, External negative 04/04/2012 HIV, External negative 04/04/2012 RPR, External Non-Reactive 04/04/2012 Gonorrhea, External negative 04/30/2012 Chlamydia, External negative 04/30/2012 Objective Visit Vitals LMP 03/02/2018 Physical Exam 
 
Gen: alert and cooperative, NAD HEENT: NCAT 
 
CV: RRR 
 
RESP: CTA bilat ABD: Gravid, soft, NT 
 
EXT: trace edema bilat NEURO: No focal deficits SKIN: No noted rashes or lesions Orvis MD Angeli 
6:07 PM 
11/15/18

## 2018-11-20 NOTE — PROGRESS NOTES
Patient ID verified. Allergies, medical history, prenatal record and prior to admission medications verified. Patient instructed to be NPO after midnight. Patient instructed to arrive at the hospital 0900 on 11/21/2018, come to entrance C and sign in at the registration desk on the 4th floor. Patient instructed to come to the hospital sooner if SROM, labor, or concerning symptoms. Patient verbalized understanding. Questions encouraged and answered.

## 2018-11-21 ENCOUNTER — ANESTHESIA EVENT (OUTPATIENT)
Dept: LABOR AND DELIVERY | Age: 28
DRG: 540 | End: 2018-11-21
Payer: MEDICAID

## 2018-11-21 ENCOUNTER — HOSPITAL ENCOUNTER (INPATIENT)
Age: 28
LOS: 3 days | Discharge: HOME | DRG: 540 | End: 2018-11-24
Attending: OBSTETRICS & GYNECOLOGY | Admitting: OBSTETRICS & GYNECOLOGY
Payer: MEDICAID

## 2018-11-21 ENCOUNTER — ANESTHESIA (OUTPATIENT)
Dept: LABOR AND DELIVERY | Age: 28
DRG: 540 | End: 2018-11-21
Payer: MEDICAID

## 2018-11-21 PROBLEM — Z98.891 STATUS POST REPEAT LOW TRANSVERSE CESAREAN SECTION: Status: ACTIVE | Noted: 2018-11-21

## 2018-11-21 LAB
ABO + RH BLD: NORMAL
BLOOD BANK CMNT PATIENT-IMP: NORMAL
BLOOD GROUP ANTIBODIES SERPL: NORMAL
ERYTHROCYTE [DISTWIDTH] IN BLOOD BY AUTOMATED COUNT: 13 %
FETAL SCREEN,FMHS: NORMAL
HCT VFR BLD AUTO: 30.1 % (ref 35.8–46.3)
HGB BLD-MCNC: 9.8 G/DL (ref 11.7–15.4)
MCH RBC QN AUTO: 27.2 PG (ref 26.1–32.9)
MCHC RBC AUTO-ENTMCNC: 32.6 G/DL (ref 31.4–35)
MCV RBC AUTO: 83.6 FL (ref 79.6–97.8)
NRBC # BLD: 0 K/UL (ref 0–0.2)
PLATELET # BLD AUTO: 207 K/UL (ref 150–450)
PMV BLD AUTO: 12.2 FL (ref 9.4–12.3)
RBC # BLD AUTO: 3.6 M/UL (ref 4.05–5.2)
SPECIMEN EXP DATE BLD: NORMAL
WBC # BLD AUTO: 8 K/UL (ref 4.3–11.1)

## 2018-11-21 PROCEDURE — 74011250637 HC RX REV CODE- 250/637: Performed by: ANESTHESIOLOGY

## 2018-11-21 PROCEDURE — 76010000391 HC C SECN FIRST 1 HR: Performed by: OBSTETRICS & GYNECOLOGY

## 2018-11-21 PROCEDURE — 65270000029 HC RM PRIVATE

## 2018-11-21 PROCEDURE — 74011250636 HC RX REV CODE- 250/636: Performed by: ANESTHESIOLOGY

## 2018-11-21 PROCEDURE — 77030034696 HC CATH URETH FOL 2W BARD -A

## 2018-11-21 PROCEDURE — 77030018846 HC SOL IRR STRL H20 ICUM -A: Performed by: OBSTETRICS & GYNECOLOGY

## 2018-11-21 PROCEDURE — 74011000250 HC RX REV CODE- 250

## 2018-11-21 PROCEDURE — 77030011943: Performed by: OBSTETRICS & GYNECOLOGY

## 2018-11-21 PROCEDURE — 77030020255 HC SOL INJ LR 1000ML BG

## 2018-11-21 PROCEDURE — 86901 BLOOD TYPING SEROLOGIC RH(D): CPT

## 2018-11-21 PROCEDURE — 74011250636 HC RX REV CODE- 250/636

## 2018-11-21 PROCEDURE — 77030007880 HC KT SPN EPDRL BBMI -B: Performed by: ANESTHESIOLOGY

## 2018-11-21 PROCEDURE — 77030018836 HC SOL IRR NACL ICUM -A: Performed by: OBSTETRICS & GYNECOLOGY

## 2018-11-21 PROCEDURE — 77030002966 HC SUT PDS J&J -A: Performed by: OBSTETRICS & GYNECOLOGY

## 2018-11-21 PROCEDURE — 4A1HXCZ MONITORING OF PRODUCTS OF CONCEPTION, CARDIAC RATE, EXTERNAL APPROACH: ICD-10-PCS | Performed by: OBSTETRICS & GYNECOLOGY

## 2018-11-21 PROCEDURE — 77030031139 HC SUT VCRL2 J&J -A: Performed by: OBSTETRICS & GYNECOLOGY

## 2018-11-21 PROCEDURE — 77030002933 HC SUT MCRYL J&J -A: Performed by: OBSTETRICS & GYNECOLOGY

## 2018-11-21 PROCEDURE — 74011250636 HC RX REV CODE- 250/636: Performed by: OBSTETRICS & GYNECOLOGY

## 2018-11-21 PROCEDURE — 85027 COMPLETE CBC AUTOMATED: CPT

## 2018-11-21 PROCEDURE — 77030003665 HC NDL SPN BBMI -A: Performed by: ANESTHESIOLOGY

## 2018-11-21 PROCEDURE — 85461 HEMOGLOBIN FETAL: CPT

## 2018-11-21 PROCEDURE — 59025 FETAL NON-STRESS TEST: CPT

## 2018-11-21 PROCEDURE — 76060000078 HC EPIDURAL ANESTHESIA: Performed by: OBSTETRICS & GYNECOLOGY

## 2018-11-21 PROCEDURE — 59514 CESAREAN DELIVERY ONLY: CPT | Performed by: OBSTETRICS & GYNECOLOGY

## 2018-11-21 PROCEDURE — 75410000003 HC RECOV DEL/VAG/CSECN EA 0.5 HR: Performed by: OBSTETRICS & GYNECOLOGY

## 2018-11-21 PROCEDURE — 77030032490 HC SLV COMPR SCD KNE COVD -B: Performed by: OBSTETRICS & GYNECOLOGY

## 2018-11-21 PROCEDURE — 77030032490 HC SLV COMPR SCD KNE COVD -B

## 2018-11-21 RX ORDER — HYDROMORPHONE HYDROCHLORIDE 2 MG/ML
1 INJECTION, SOLUTION INTRAMUSCULAR; INTRAVENOUS; SUBCUTANEOUS
Status: DISCONTINUED | OUTPATIENT
Start: 2018-11-21 | End: 2018-11-22

## 2018-11-21 RX ORDER — EPHEDRINE SULFATE 50 MG/ML
INJECTION, SOLUTION INTRAVENOUS AS NEEDED
Status: DISCONTINUED | OUTPATIENT
Start: 2018-11-21 | End: 2018-11-21 | Stop reason: HOSPADM

## 2018-11-21 RX ORDER — TRISODIUM CITRATE DIHYDRATE AND CITRIC ACID MONOHYDRATE 500; 334 MG/5ML; MG/5ML
30 SOLUTION ORAL
Status: COMPLETED | OUTPATIENT
Start: 2018-11-21 | End: 2018-11-21

## 2018-11-21 RX ORDER — KETOROLAC TROMETHAMINE 30 MG/ML
30 INJECTION, SOLUTION INTRAMUSCULAR; INTRAVENOUS EVERY 6 HOURS
Status: DISCONTINUED | OUTPATIENT
Start: 2018-11-21 | End: 2018-11-22

## 2018-11-21 RX ORDER — DEXTROSE, SODIUM CHLORIDE, SODIUM LACTATE, POTASSIUM CHLORIDE, AND CALCIUM CHLORIDE 5; .6; .31; .03; .02 G/100ML; G/100ML; G/100ML; G/100ML; G/100ML
125 INJECTION, SOLUTION INTRAVENOUS CONTINUOUS
Status: DISCONTINUED | OUTPATIENT
Start: 2018-11-21 | End: 2018-11-21 | Stop reason: HOSPADM

## 2018-11-21 RX ORDER — NALOXONE HYDROCHLORIDE 0.4 MG/ML
0.2 INJECTION, SOLUTION INTRAMUSCULAR; INTRAVENOUS; SUBCUTANEOUS
Status: DISCONTINUED | OUTPATIENT
Start: 2018-11-21 | End: 2018-11-22

## 2018-11-21 RX ORDER — SODIUM CHLORIDE 0.9 % (FLUSH) 0.9 %
5-10 SYRINGE (ML) INJECTION AS NEEDED
Status: DISCONTINUED | OUTPATIENT
Start: 2018-11-21 | End: 2018-11-21 | Stop reason: HOSPADM

## 2018-11-21 RX ORDER — KETOROLAC TROMETHAMINE 30 MG/ML
INJECTION, SOLUTION INTRAMUSCULAR; INTRAVENOUS AS NEEDED
Status: DISCONTINUED | OUTPATIENT
Start: 2018-11-21 | End: 2018-11-21 | Stop reason: HOSPADM

## 2018-11-21 RX ORDER — SODIUM CHLORIDE, SODIUM LACTATE, POTASSIUM CHLORIDE, CALCIUM CHLORIDE 600; 310; 30; 20 MG/100ML; MG/100ML; MG/100ML; MG/100ML
INJECTION, SOLUTION INTRAVENOUS
Status: DISCONTINUED | OUTPATIENT
Start: 2018-11-21 | End: 2018-11-21 | Stop reason: HOSPADM

## 2018-11-21 RX ORDER — FENTANYL CITRATE 50 UG/ML
INJECTION, SOLUTION INTRAMUSCULAR; INTRAVENOUS AS NEEDED
Status: DISCONTINUED | OUTPATIENT
Start: 2018-11-21 | End: 2018-11-21 | Stop reason: HOSPADM

## 2018-11-21 RX ORDER — ONDANSETRON 2 MG/ML
4 INJECTION INTRAMUSCULAR; INTRAVENOUS
Status: DISCONTINUED | OUTPATIENT
Start: 2018-11-21 | End: 2018-11-22

## 2018-11-21 RX ORDER — SODIUM CHLORIDE, SODIUM LACTATE, POTASSIUM CHLORIDE, CALCIUM CHLORIDE 600; 310; 30; 20 MG/100ML; MG/100ML; MG/100ML; MG/100ML
125 INJECTION, SOLUTION INTRAVENOUS CONTINUOUS
Status: DISCONTINUED | OUTPATIENT
Start: 2018-11-21 | End: 2018-11-21 | Stop reason: HOSPADM

## 2018-11-21 RX ORDER — ACETAMINOPHEN 500 MG
1000 TABLET ORAL EVERY 6 HOURS
Status: DISCONTINUED | OUTPATIENT
Start: 2018-11-21 | End: 2018-11-22

## 2018-11-21 RX ORDER — SODIUM CHLORIDE, SODIUM LACTATE, POTASSIUM CHLORIDE, CALCIUM CHLORIDE 600; 310; 30; 20 MG/100ML; MG/100ML; MG/100ML; MG/100ML
150 INJECTION, SOLUTION INTRAVENOUS CONTINUOUS
Status: DISCONTINUED | OUTPATIENT
Start: 2018-11-21 | End: 2018-11-23 | Stop reason: ALTCHOICE

## 2018-11-21 RX ORDER — BUPIVACAINE HYDROCHLORIDE 7.5 MG/ML
INJECTION, SOLUTION INTRASPINAL AS NEEDED
Status: DISCONTINUED | OUTPATIENT
Start: 2018-11-21 | End: 2018-11-21 | Stop reason: HOSPADM

## 2018-11-21 RX ORDER — OXYTOCIN/RINGER'S LACTATE 30/500 ML
250 PLASTIC BAG, INJECTION (ML) INTRAVENOUS ONCE
Status: DISCONTINUED | OUTPATIENT
Start: 2018-11-21 | End: 2018-11-21 | Stop reason: HOSPADM

## 2018-11-21 RX ORDER — SODIUM CHLORIDE, SODIUM LACTATE, POTASSIUM CHLORIDE, CALCIUM CHLORIDE 600; 310; 30; 20 MG/100ML; MG/100ML; MG/100ML; MG/100ML
50 INJECTION, SOLUTION INTRAVENOUS CONTINUOUS
Status: DISCONTINUED | OUTPATIENT
Start: 2018-11-21 | End: 2018-11-22

## 2018-11-21 RX ORDER — OXYCODONE HYDROCHLORIDE 5 MG/1
10 TABLET ORAL
Status: DISCONTINUED | OUTPATIENT
Start: 2018-11-21 | End: 2018-11-22 | Stop reason: SDUPTHER

## 2018-11-21 RX ORDER — ONDANSETRON 2 MG/ML
INJECTION INTRAMUSCULAR; INTRAVENOUS AS NEEDED
Status: DISCONTINUED | OUTPATIENT
Start: 2018-11-21 | End: 2018-11-21 | Stop reason: HOSPADM

## 2018-11-21 RX ORDER — MORPHINE SULFATE 0.5 MG/ML
INJECTION, SOLUTION EPIDURAL; INTRATHECAL; INTRAVENOUS AS NEEDED
Status: DISCONTINUED | OUTPATIENT
Start: 2018-11-21 | End: 2018-11-21 | Stop reason: HOSPADM

## 2018-11-21 RX ORDER — CEFAZOLIN SODIUM/WATER 2 G/20 ML
2 SYRINGE (ML) INTRAVENOUS ONCE
Status: COMPLETED | OUTPATIENT
Start: 2018-11-21 | End: 2018-11-21

## 2018-11-21 RX ORDER — SODIUM CHLORIDE 0.9 % (FLUSH) 0.9 %
5-10 SYRINGE (ML) INJECTION EVERY 8 HOURS
Status: DISCONTINUED | OUTPATIENT
Start: 2018-11-21 | End: 2018-11-21 | Stop reason: HOSPADM

## 2018-11-21 RX ORDER — OXYTOCIN/RINGER'S LACTATE 30/500 ML
PLASTIC BAG, INJECTION (ML) INTRAVENOUS
Status: DISCONTINUED | OUTPATIENT
Start: 2018-11-21 | End: 2018-11-21 | Stop reason: HOSPADM

## 2018-11-21 RX ORDER — NALBUPHINE HYDROCHLORIDE 10 MG/ML
5 INJECTION, SOLUTION INTRAMUSCULAR; INTRAVENOUS; SUBCUTANEOUS
Status: DISCONTINUED | OUTPATIENT
Start: 2018-11-21 | End: 2018-11-22

## 2018-11-21 RX ADMIN — SODIUM CHLORIDE, SODIUM LACTATE, POTASSIUM CHLORIDE, AND CALCIUM CHLORIDE 50 ML/HR: 600; 310; 30; 20 INJECTION, SOLUTION INTRAVENOUS at 20:44

## 2018-11-21 RX ADMIN — SODIUM CHLORIDE, SODIUM LACTATE, POTASSIUM CHLORIDE, AND CALCIUM CHLORIDE 50 ML/HR: 600; 310; 30; 20 INJECTION, SOLUTION INTRAVENOUS at 14:03

## 2018-11-21 RX ADMIN — ONDANSETRON 4 MG: 2 INJECTION INTRAMUSCULAR; INTRAVENOUS at 12:46

## 2018-11-21 RX ADMIN — Medication 500 ML/HR: at 12:46

## 2018-11-21 RX ADMIN — FENTANYL CITRATE 20 MCG: 50 INJECTION, SOLUTION INTRAMUSCULAR; INTRAVENOUS at 12:32

## 2018-11-21 RX ADMIN — OXYCODONE HYDROCHLORIDE 5 MG: 5 TABLET ORAL at 20:06

## 2018-11-21 RX ADMIN — ACETAMINOPHEN 1000 MG: 500 TABLET, FILM COATED ORAL at 18:31

## 2018-11-21 RX ADMIN — EPHEDRINE SULFATE 5 MG: 50 INJECTION, SOLUTION INTRAVENOUS at 12:36

## 2018-11-21 RX ADMIN — BUPIVACAINE HYDROCHLORIDE 1.6 ML: 7.5 INJECTION, SOLUTION INTRASPINAL at 12:32

## 2018-11-21 RX ADMIN — SODIUM CITRATE AND CITRIC ACID MONOHYDRATE 30 ML: 500; 334 SOLUTION ORAL at 12:16

## 2018-11-21 RX ADMIN — Medication 2 G: at 11:59

## 2018-11-21 RX ADMIN — SODIUM CHLORIDE, SODIUM LACTATE, POTASSIUM CHLORIDE, CALCIUM CHLORIDE: 600; 310; 30; 20 INJECTION, SOLUTION INTRAVENOUS at 12:24

## 2018-11-21 RX ADMIN — KETOROLAC TROMETHAMINE 30 MG: 30 INJECTION, SOLUTION INTRAMUSCULAR at 18:31

## 2018-11-21 RX ADMIN — MORPHINE SULFATE 150 MCG: 0.5 INJECTION, SOLUTION EPIDURAL; INTRATHECAL; INTRAVENOUS at 12:32

## 2018-11-21 RX ADMIN — KETOROLAC TROMETHAMINE 30 MG: 30 INJECTION, SOLUTION INTRAMUSCULAR; INTRAVENOUS at 12:59

## 2018-11-21 NOTE — PROGRESS NOTES
delivery of viable female infant, apgars 9/9 per Dr. Akanksha Olivarez. Infant to warmer to NICU team for assessment. Initial infant assessment performed by Dr. Akanksha Olivarez, see MD note.

## 2018-11-21 NOTE — ANESTHESIA PREPROCEDURE EVALUATION
Anesthetic History No history of anesthetic complications Review of Systems / Medical History Patient summary reviewed, nursing notes reviewed and pertinent labs reviewed Pulmonary Within defined limits Neuro/Psych Within defined limits Cardiovascular Within defined limits Exercise tolerance: >4 METS 
  
GI/Hepatic/Renal 
Within defined limits Endo/Other Within defined limits Other Findings Physical Exam 
 
Airway Mallampati: I Mouth opening: Normal 
 
 Cardiovascular Regular rate and rhythm,  S1 and S2 normal,  no murmur, click, rub, or gallop Dental 
No notable dental hx Pulmonary Breath sounds clear to auscultation Abdominal 
 
 
 
 Other Findings Anesthetic Plan ASA: 2 Anesthesia type: spinal 
 
 
Post-op pain plan if not by surgeon: intrathecal opiates Anesthetic plan and risks discussed with: Patient and Spouse Discussed intrathecal morphine- risk for PONV but not true allergy to it. She is okay with using intrathecal morphine

## 2018-11-21 NOTE — PROGRESS NOTES
SBAR OUT Report: Mother Verbal report given to Javon Jay RN on this patient, who is now being transferred to MIU for routine progression of care. The patient is wearing a green \"Anesthesia-Duramorph\" band. Report consisted of patient's Situation, Background, Assessment and Recommendations (SBAR).  ID bands were compared with the identification form, and verified with the patient and receiving nurse. Information from the SBAR, OR Summary, Procedure Summary, Intake/Output and MAR and the [place] Report was reviewed with the receiving nurse; opportunity for questions and clarification provided.

## 2018-11-21 NOTE — ROUTINE PROCESS
SBAR IN Report: Mother Verbal report received from Raulito Cochran RN on this patient, who is now being transferred from L&D (unit) for routine progression of care. The patient is wearing a green \"Anesthesia-Duramorph\" band. Report consisted of patient's Situation, Background, Assessment and Recommendations (SBAR).  ID bands were compared with the identification form, and verified with the patient and transferring nurse. Information from the SBAR and the [place] Report was reviewed with the transferring nurse; opportunity for questions and clarification provided.

## 2018-11-21 NOTE — OP NOTES
[facility] [facility]  1990    Preop Dx:   1. IUP @ 39 weeks gestation   2. Previous     Postop Dx: Same    Procedure: repeat LTCS    Surgeon: Karishma Colmenares      Anesthesia: spinal    EBL: 500 mL  IVF: 900 mL  UOP: 25 mL    Complications: None    Findings:  Viable Female infant. Vtx position. APGARS 9,9.  Weight:  6 lbs, 14 oz. Normal appearing uterus, tubes and ovaries. Procedure:  Pt was taken to the operating room where spinal anesthesia was found to be adequate. She was then prepped and draped in the usual sterile fashion and placed in the supine position with a leftward tilt. A Pfannenstiel skin incision was made with the scalpel and carried down to the underlying layer of fascia with the bovie. The fascia was incised in the midline and the incision extended laterally with the vang scissors. Superior of the fascial incision was grasped with Kocher clamps and  from the rectus muscles sharply and bluntly. In a similar fashion, the inferior aspect of the fascial incision was grasped with the Kocher clamps and  from the rectus muscles sharply and bluntly. The rectus muscles were  in the midline bluntly and peritoneum entered bluntly. The peritoneal incision was extended manually. Bladder blade was inserted and lower uterine incision made with the scalpel. Incision extended manually laterally. Infants head delivered atraumatically. Nose and mouth suctioned. Cord clamped and cut. Infant handed off to the waiting NICU staff. The uterus was exteriorized and cleared of all clots and debris. Hysterotomy was closed with 0-vicryl in a running, locking fashion. A second layer of 0-vicryl was placed in a running fashion to imbricate the incision. The pelvis and gutters were cleared of all clots and debris, the uterus returned to the abdomen and hemostasis was assured throughout. Intercede was placed over the hysterotomy site.   Fascial incision repaired with 0-PDS in a running fashion. Subcutaneous tissue was cleared of all clots and debris and hemostasis assured. Subcutaneous tissue reapproximated with 3-0 vicryl rapide in a running fashion. Skin closed with 4-0 monocryl in a subcuticular fashion. Pt tolerated procedure well. Sponge, lap and needle counts correct x 3. Disposition: Pt to RR stable and infant to  nursery stable.     Pathology: none      Chidi Pillai MD  1:16 PM  18

## 2018-11-21 NOTE — ANESTHESIA PROCEDURE NOTES
Spinal Block Start time: 11/21/2018 12:27 PM 
End time: 11/21/2018 12:32 PM 
Performed by: Dragan Sultana MD 
Authorized by: Dragan Sultana MD  
 
Pre-procedure: Indications: primary anesthetic  Preanesthetic Checklist: patient identified, risks and benefits discussed, anesthesia consent, patient being monitored and timeout performed Timeout Time: 12:27 Spinal Block:  
Patient Position:  Seated Prep Region:  Lumbar Prep: chlorhexidine and patient draped Location:  L3-4 Technique:  Single shot Local Dose (mL):  3 Needle:  
Needle Type:  Pencan Needle Gauge:  25 G Attempts:  1 Events: CSF confirmed, no blood with aspiration and no paresthesia Assessment: 
Insertion:  Uncomplicated Patient tolerance:  Patient tolerated the procedure well with no immediate complications

## 2018-11-22 LAB
ERYTHROCYTE [DISTWIDTH] IN BLOOD BY AUTOMATED COUNT: 13.1 %
HCT VFR BLD AUTO: 26.7 % (ref 35.8–46.3)
HGB BLD-MCNC: 8.1 G/DL (ref 11.7–15.4)
MCH RBC QN AUTO: 25.9 PG (ref 26.1–32.9)
MCHC RBC AUTO-ENTMCNC: 30.3 G/DL (ref 31.4–35)
MCV RBC AUTO: 85.3 FL (ref 79.6–97.8)
NRBC # BLD: 0 K/UL (ref 0–0.2)
PLATELET # BLD AUTO: 140 K/UL (ref 150–450)
PMV BLD AUTO: 12 FL (ref 9.4–12.3)
RBC # BLD AUTO: 3.13 M/UL (ref 4.05–5.2)
WBC # BLD AUTO: 10.3 K/UL (ref 4.3–11.1)

## 2018-11-22 PROCEDURE — 74011250637 HC RX REV CODE- 250/637: Performed by: ANESTHESIOLOGY

## 2018-11-22 PROCEDURE — 85027 COMPLETE CBC AUTOMATED: CPT

## 2018-11-22 PROCEDURE — 65270000029 HC RM PRIVATE

## 2018-11-22 PROCEDURE — 74011250636 HC RX REV CODE- 250/636: Performed by: ANESTHESIOLOGY

## 2018-11-22 PROCEDURE — 74011250636 HC RX REV CODE- 250/636: Performed by: OBSTETRICS & GYNECOLOGY

## 2018-11-22 PROCEDURE — 36415 COLL VENOUS BLD VENIPUNCTURE: CPT

## 2018-11-22 PROCEDURE — 74011250637 HC RX REV CODE- 250/637: Performed by: OBSTETRICS & GYNECOLOGY

## 2018-11-22 RX ORDER — ZOLPIDEM TARTRATE 5 MG/1
5 TABLET ORAL
Status: DISCONTINUED | OUTPATIENT
Start: 2018-11-22 | End: 2018-11-24 | Stop reason: HOSPADM

## 2018-11-22 RX ORDER — DIPHENHYDRAMINE HCL 25 MG
25 CAPSULE ORAL
Status: DISCONTINUED | OUTPATIENT
Start: 2018-11-22 | End: 2018-11-24 | Stop reason: HOSPADM

## 2018-11-22 RX ORDER — HYDROMORPHONE HYDROCHLORIDE 2 MG/ML
1 INJECTION, SOLUTION INTRAMUSCULAR; INTRAVENOUS; SUBCUTANEOUS
Status: DISCONTINUED | OUTPATIENT
Start: 2018-11-22 | End: 2018-11-23 | Stop reason: ALTCHOICE

## 2018-11-22 RX ORDER — OXYCODONE AND ACETAMINOPHEN 5; 325 MG/1; MG/1
1 TABLET ORAL
Qty: 24 TAB | Refills: 0 | Status: SHIPPED | OUTPATIENT
Start: 2018-11-22 | End: 2018-12-05

## 2018-11-22 RX ORDER — OXYCODONE HYDROCHLORIDE 5 MG/1
10 TABLET ORAL
Status: DISCONTINUED | OUTPATIENT
Start: 2018-11-22 | End: 2018-11-24 | Stop reason: HOSPADM

## 2018-11-22 RX ORDER — ONDANSETRON 4 MG/1
4 TABLET, ORALLY DISINTEGRATING ORAL
Status: DISCONTINUED | OUTPATIENT
Start: 2018-11-22 | End: 2018-11-24 | Stop reason: HOSPADM

## 2018-11-22 RX ORDER — ONDANSETRON 4 MG/1
4 TABLET, ORALLY DISINTEGRATING ORAL
Qty: 20 TAB | Refills: 1 | Status: SHIPPED | OUTPATIENT
Start: 2018-11-22 | End: 2019-01-03

## 2018-11-22 RX ORDER — PRENATAL VIT 96/IRON FUM/FOLIC 27MG-0.8MG
1 TABLET ORAL DAILY
Status: DISCONTINUED | OUTPATIENT
Start: 2018-11-23 | End: 2018-11-24 | Stop reason: HOSPADM

## 2018-11-22 RX ORDER — SODIUM CHLORIDE 0.9 % (FLUSH) 0.9 %
5-10 SYRINGE (ML) INJECTION AS NEEDED
Status: DISCONTINUED | OUTPATIENT
Start: 2018-11-22 | End: 2018-11-23 | Stop reason: ALTCHOICE

## 2018-11-22 RX ORDER — SIMETHICONE 80 MG
80 TABLET,CHEWABLE ORAL
Status: DISCONTINUED | OUTPATIENT
Start: 2018-11-22 | End: 2018-11-24 | Stop reason: HOSPADM

## 2018-11-22 RX ORDER — IBUPROFEN 600 MG/1
600 TABLET ORAL
Status: DISCONTINUED | OUTPATIENT
Start: 2018-11-22 | End: 2018-11-24 | Stop reason: HOSPADM

## 2018-11-22 RX ORDER — DOCUSATE SODIUM 100 MG/1
100 CAPSULE, LIQUID FILLED ORAL 2 TIMES DAILY
Status: DISCONTINUED | OUTPATIENT
Start: 2018-11-22 | End: 2018-11-24 | Stop reason: HOSPADM

## 2018-11-22 RX ORDER — IBUPROFEN 600 MG/1
600 TABLET ORAL
Qty: 60 TAB | Refills: 2 | Status: SHIPPED | OUTPATIENT
Start: 2018-11-22 | End: 2020-11-13

## 2018-11-22 RX ORDER — SODIUM CHLORIDE 0.9 % (FLUSH) 0.9 %
5-10 SYRINGE (ML) INJECTION EVERY 8 HOURS
Status: DISCONTINUED | OUTPATIENT
Start: 2018-11-22 | End: 2018-11-23 | Stop reason: ALTCHOICE

## 2018-11-22 RX ORDER — OXYCODONE HYDROCHLORIDE 5 MG/1
5 TABLET ORAL
Status: DISCONTINUED | OUTPATIENT
Start: 2018-11-22 | End: 2018-11-24 | Stop reason: HOSPADM

## 2018-11-22 RX ORDER — HYDROMORPHONE HYDROCHLORIDE 2 MG/ML
0.5 INJECTION, SOLUTION INTRAMUSCULAR; INTRAVENOUS; SUBCUTANEOUS
Status: DISCONTINUED | OUTPATIENT
Start: 2018-11-22 | End: 2018-11-23 | Stop reason: ALTCHOICE

## 2018-11-22 RX ADMIN — ONDANSETRON 4 MG: 4 TABLET, ORALLY DISINTEGRATING ORAL at 21:12

## 2018-11-22 RX ADMIN — ACETAMINOPHEN 1000 MG: 500 TABLET, FILM COATED ORAL at 06:06

## 2018-11-22 RX ADMIN — ONDANSETRON 4 MG: 4 TABLET, ORALLY DISINTEGRATING ORAL at 12:22

## 2018-11-22 RX ADMIN — KETOROLAC TROMETHAMINE 30 MG: 30 INJECTION, SOLUTION INTRAMUSCULAR at 06:08

## 2018-11-22 RX ADMIN — DOCUSATE SODIUM 100 MG: 100 CAPSULE, LIQUID FILLED ORAL at 17:15

## 2018-11-22 RX ADMIN — ACETAMINOPHEN 1000 MG: 500 TABLET, FILM COATED ORAL at 00:25

## 2018-11-22 RX ADMIN — OXYCODONE HYDROCHLORIDE 5 MG: 5 TABLET ORAL at 21:13

## 2018-11-22 RX ADMIN — RHO(D) IMMUNE GLOBULIN (HUMAN) 0.3 MG: 1500 SOLUTION INTRAMUSCULAR at 12:24

## 2018-11-22 RX ADMIN — OXYCODONE HYDROCHLORIDE 5 MG: 5 TABLET ORAL at 12:22

## 2018-11-22 RX ADMIN — OXYCODONE HYDROCHLORIDE 5 MG: 5 TABLET ORAL at 17:15

## 2018-11-22 RX ADMIN — SIMETHICONE CHEW TAB 80 MG 80 MG: 80 TABLET ORAL at 21:12

## 2018-11-22 RX ADMIN — DOCUSATE SODIUM 100 MG: 100 CAPSULE, LIQUID FILLED ORAL at 12:22

## 2018-11-22 RX ADMIN — IBUPROFEN 600 MG: 600 TABLET ORAL at 12:22

## 2018-11-22 RX ADMIN — SIMETHICONE CHEW TAB 80 MG 80 MG: 80 TABLET ORAL at 17:15

## 2018-11-22 RX ADMIN — IBUPROFEN 600 MG: 600 TABLET ORAL at 18:35

## 2018-11-22 RX ADMIN — KETOROLAC TROMETHAMINE 30 MG: 30 INJECTION, SOLUTION INTRAMUSCULAR at 00:25

## 2018-11-22 NOTE — LACTATION NOTE

## 2018-11-22 NOTE — ANESTHESIA POSTPROCEDURE EVALUATION
Procedure(s):  SECTION. Anesthesia Post Evaluation Multimodal analgesia: multimodal analgesia used between 6 hours prior to anesthesia start to PACU discharge Patient participation: complete - patient participated Level of consciousness: awake and alert Pain management: adequate Airway patency: patent Anesthetic complications: no 
Cardiovascular status: acceptable Respiratory status: acceptable, spontaneous ventilation and nonlabored ventilation Hydration status: acceptable Comments: Mild pain Post anesthesia nausea and vomiting:  none Visit Vitals BP 93/40 (BP 1 Location: Right arm, BP Patient Position: At rest) Pulse 74 Temp 36.4 °C (97.5 °F) Resp 20 Ht 5' 5\" (1.651 m) Wt 77.6 kg (171 lb) SpO2 99% Breastfeeding? Yes  
BMI 28.46 kg/m²

## 2018-11-22 NOTE — PROGRESS NOTES
Shift assessment complete see flowsheet. Discussed tonight plan of care with pt. Encouraged pt to increase her fluid intake and to keep track of her I&O's, sheet given to pt. Pt output has increased since the start of this RN shift, at start of shift pt had <50ml of urine in lopez bag, pt now has approximately 200ml in bag. Pt instructed not to get out of bed at this time and to call for assistance. SCD on BLE and working. Water pitcher refilled. Questions encouraged. Pt to call for needs/concerns. Pt in bed with HOB elevated, call light within reach.

## 2018-11-22 NOTE — PROGRESS NOTES
Pt c/o increase vaginal bleeding after she finished breastfeeding. Fundus firm/ at umbilicus and midline/ no active bleeding noted when pressing on fundus however pt had moderate amount of blood noted on ruthy pad with small grape size clot on gown.

## 2018-11-22 NOTE — PROGRESS NOTES
Anesthesiology  Post-op Note Post-op day 1 s/p  via spinal with neuraxial opioids for post-op pain management. Visit Vitals BP 92/46 (BP 1 Location: Right arm, BP Patient Position: At rest) Pulse 72 Temp 36.7 °C (98.1 °F) Resp 16 Ht 5' 5\" (1.651 m) Wt 77.6 kg (171 lb) LMP 2018 SpO2 99% Breastfeeding? Yes  
BMI 28.46 kg/m² Airway patent, patient appropriately hydrated and appears euvolemic. Patient is Alert and oriented. Pain is well controlled. Pruritus is absent. Nausea is absent. No complaints about back or site of injection. Motor and sensory function has returned to baseline in lower extremities. Patient is satisfied with anesthetic and reports no complications. Continue current orders, then initiate surgeon's orders for pain management 24 hours after . Follow up per surgeon.

## 2018-11-22 NOTE — PROGRESS NOTES
Report received from Jeffrey Dietz RN care assumed. Pt sitting up in bed holding infant, no complaints at this time. Call light within reach.

## 2018-11-22 NOTE — PROGRESS NOTES
Pt is S/P repeat  at 39 weeks due to prev C/S. No complaints today. Normal PO pain. No GI/ issues. Lochia < menses. No F/C. Visit Vitals BP 96/45 (BP 1 Location: Right arm, BP Patient Position: At rest) Pulse 70 Temp 98.2 °F (36.8 °C) Resp 18 Ht 5' 5\" (1.651 m) Wt 171 lb (77.6 kg) SpO2 97% Breastfeeding? Yes  
BMI 28.46 kg/m² CV - RRR 
LUNGS - CTA bilaterally ABD - soft, appropriate tenderness, NABS, incision C/D/I 
EXT - tr edema bilaterally Labs:   
Recent Results (from the past 24 hour(s)) FETAL SCREEN Collection Time: 18  8:15 PM  
Result Value Ref Range Fetal screen NEG Comment IMMUCOR GAMMA KIT LOT [id number] EXP 2018 CBC W/O DIFF Collection Time: 18  5:43 AM  
Result Value Ref Range WBC 10.3 4.3 - 11.1 K/uL  
 RBC 3.13 (L) 4.05 - 5.2 M/uL HGB 8.1 (L) 11.7 - 15.4 g/dL HCT 26.7 (L) 35.8 - 46.3 % MCV 85.3 79.6 - 97.8 FL  
 MCH 25.9 (L) 26.1 - 32.9 PG  
 MCHC 30.3 (L) 31.4 - 35.0 g/dL  
 RDW 13.1 % PLATELET 256 (L) 857 - 450 K/uL MPV 12.0 9.4 - 12.3 FL ABSOLUTE NRBC 0.00 0.0 - 0.2 K/uL POD #1 LTCS Pt is breast feeding and plans on P4 method for birth control. Stable, cont. routine PP/PO care. Pt desires D/C in am 
 
Jo Kitchen MD 
11:56 AM 
18

## 2018-11-22 NOTE — PROGRESS NOTES
SCD removed, IV capped. Pt up and ambulated to bathroom with assistance from this RN and Koffi Neff, PCT. Ruthy care taught and pt performed. Pt voided a scant amount. Gown and ruthy pad changed. No active bleeding noted when pt is standing and no clots in toilet. Linens changed. Pt ambulated back to bed, pt tolerated well. Encouraged pt to increase her fluid intake and to attempt to void frequently. Dressing removed from abdomen. Pt educated on Chlorhexidine wash and placed in shower for pt. Pt voiced understanding. Pt in bed with HOB elevated, no complaints at this time and call light within reach.

## 2018-11-22 NOTE — LACTATION NOTE
This note was copied from a baby's chart. Assisted with breastfeeding in cross cradle on L. Baby fed fairly well, latched easily. Demonstrated manual lip flange. Encouraged frequent feeding and watch output. Discussed lactation will not be available tomorrow. Reviewed need for consistent feeding in baby's second 24 hours. Noted mom had a breast augmentation surgery last year. Reviewed need to be additionally cautious with engorgement.

## 2018-11-23 PROCEDURE — 65270000029 HC RM PRIVATE

## 2018-11-23 PROCEDURE — 74011250637 HC RX REV CODE- 250/637: Performed by: OBSTETRICS & GYNECOLOGY

## 2018-11-23 RX ADMIN — IBUPROFEN 600 MG: 600 TABLET ORAL at 21:27

## 2018-11-23 RX ADMIN — IBUPROFEN 600 MG: 600 TABLET ORAL at 00:56

## 2018-11-23 RX ADMIN — IBUPROFEN 600 MG: 600 TABLET ORAL at 14:27

## 2018-11-23 RX ADMIN — OXYCODONE HYDROCHLORIDE 5 MG: 5 TABLET ORAL at 04:48

## 2018-11-23 RX ADMIN — PRENATAL VIT W/ FE FUMARATE-FA TAB 27-0.8 MG 1 TABLET: 27-0.8 TAB at 09:57

## 2018-11-23 RX ADMIN — OXYCODONE HYDROCHLORIDE 10 MG: 5 TABLET ORAL at 17:01

## 2018-11-23 RX ADMIN — DOCUSATE SODIUM 100 MG: 100 CAPSULE, LIQUID FILLED ORAL at 17:01

## 2018-11-23 RX ADMIN — IBUPROFEN 600 MG: 600 TABLET ORAL at 06:41

## 2018-11-23 RX ADMIN — OXYCODONE HYDROCHLORIDE 10 MG: 5 TABLET ORAL at 09:57

## 2018-11-23 RX ADMIN — SIMETHICONE CHEW TAB 80 MG 80 MG: 80 TABLET ORAL at 21:27

## 2018-11-23 RX ADMIN — SIMETHICONE CHEW TAB 80 MG 80 MG: 80 TABLET ORAL at 06:42

## 2018-11-23 RX ADMIN — SIMETHICONE CHEW TAB 80 MG 80 MG: 80 TABLET ORAL at 14:27

## 2018-11-23 RX ADMIN — SIMETHICONE CHEW TAB 80 MG 80 MG: 80 TABLET ORAL at 09:56

## 2018-11-23 RX ADMIN — DOCUSATE SODIUM 100 MG: 100 CAPSULE, LIQUID FILLED ORAL at 09:57

## 2018-11-23 RX ADMIN — ONDANSETRON 4 MG: 4 TABLET, ORALLY DISINTEGRATING ORAL at 04:48

## 2018-11-23 NOTE — PROGRESS NOTES
Chart reviewed - no needs identified.  made introduction to family and provided informational packet on  mood disorder education/resources. Patient states that she experienced some baby blues after her first child (). The onset of symptoms was immediately after delivery and lasted for a few days. Per patient, \"I just cried all the time. \"  Patient reports \"feeling good\" after this delivery and states that she has a stronger support system now then she did with her first pregnancy. Family receptive to receiving information and denied any additional needs from . Family has this 's contact information should any needs/questions arise. Annie Asher [address] 34

## 2018-11-23 NOTE — LACTATION NOTE
This note was copied from a baby's chart. Lactation visit. In to check on feedings. Baby just nursed on left breast, still fussy so mom latched baby on right breast in cradle hold. Has good technique, baby latches well. Observed baby feed x 20 minutes on right breast. Doing well at the breast. Reviewed signs of good latch. Nipples tender, gave lanolin. History of breast augmentation. Discussed insurance pumping due to surgery and mom agreeable. Pump set up at bedside with full instruction. Assisted mom to pump, and mom pumping now at present time. Encouraged mom to pump after every other daytime feeding to added stimulation. Augmentation done since last child, has only been 1 year since surgery. High risk for engorgement due to implants, discussed engorgement management. Questions answered. Will monitor closely.

## 2018-11-24 VITALS
HEART RATE: 66 BPM | HEIGHT: 65 IN | WEIGHT: 171 LBS | SYSTOLIC BLOOD PRESSURE: 100 MMHG | TEMPERATURE: 97.4 F | RESPIRATION RATE: 16 BRPM | DIASTOLIC BLOOD PRESSURE: 43 MMHG | OXYGEN SATURATION: 97 % | BODY MASS INDEX: 28.49 KG/M2

## 2018-11-24 PROCEDURE — 74011250637 HC RX REV CODE- 250/637: Performed by: OBSTETRICS & GYNECOLOGY

## 2018-11-24 RX ADMIN — OXYCODONE HYDROCHLORIDE 5 MG: 5 TABLET ORAL at 10:11

## 2018-11-24 RX ADMIN — SIMETHICONE CHEW TAB 80 MG 80 MG: 80 TABLET ORAL at 10:11

## 2018-11-24 RX ADMIN — IBUPROFEN 600 MG: 600 TABLET ORAL at 10:11

## 2018-11-24 RX ADMIN — IBUPROFEN 600 MG: 600 TABLET ORAL at 03:29

## 2018-11-24 RX ADMIN — PRENATAL VIT W/ FE FUMARATE-FA TAB 27-0.8 MG 1 TABLET: 27-0.8 TAB at 10:11

## 2018-11-24 RX ADMIN — DOCUSATE SODIUM 100 MG: 100 CAPSULE, LIQUID FILLED ORAL at 10:11

## 2018-11-24 NOTE — LACTATION NOTE
Mom and baby are going home today. Continue to offer the breast without restriction. Mom's milk should be fully in over the next few days. Reviewed engorgement precautions. Hand Expression has been demoed and written hand-out reviewed. As milk comes in baby will be more alert at the breast and swallows will be more obvious. Breasts may feel softer once baby has finished nursing. Baby should be back to birth weight by 3weeks of age. And then gain on average 1 oz per day for the next 2-3 months. Reviewed babies should be exclusively breastfeeding for the first 6 months and that breastfeeding should continue after introduction of appropriate complimentary foods after 6 months. Initial output should be at least 1 wet and 1 bowel movement for each day old baby is. By day 5-7 once milk is fully in baby will consistently have 6 or more soaking wet diapers and about 4 bowel movement. Some babies have a bowel movement with every feeding and some have 1-3 large bowel movements each day. Inadequate output may indicate inadequate feedings and should be reported to your Pediatrician. Bowel habits may change as baby gets older. Encouraged follow-up at Pediatrician in 1-2 days, no later than 1 week of age. Call [facility] for any questions as needed or to set up an OP visit. OP phone calls are returned within 24 hours. Community Breastfeeding Resource List given. Latching and feeding well per mom. Nipples very tender but just with latch on only. Reviewed sore nipple protocol. Milk coming in, has pumped a few times as suggested due to augmentation history. Breasts mildly engorged now. Reviewed engorgement precautions and discussed management. Baby up 0.5oz in weight, good output.

## 2018-11-24 NOTE — DISCHARGE INSTRUCTIONS
Discharge instruction to follow: Activity: Pelvis rest for 6 weeks     No heavy lifting over 15 lbs for 2 weeks     No driving for 2 weeks     No push/pull motion such as sweeping or vacuuming for 2 weeks     No tub baths for 6 weeks     section keep incision clean and dry, may shower as normal with soap and water. Inspect incision every day for signs of infection listed below. Continue to use ruthy-bottle with every void or bowel movement until comfortable stopping. Change sanitary pad after each urination or bowel movement. Call MD for the following:      Fever over 101 F; pain not relieved by medication; foul smelling vaginal discharge or increase in vaginal bleeding. Redness, swelling, or drainage from  incision. Take medication as prescribed. Follow up with MD as order.  Section: What to Expect at [address] [place] [address]    A  section, or , is surgery to deliver your baby through a cut, called an incision, that the doctor makes in your lower belly and uterus. You may have some pain in your lower belly and need pain medicine for 1 to 2 weeks. You can expect some vaginal bleeding for several weeks. You will probably need about 6 weeks to fully recover. It is important to take it easy while the incision is healing. Avoid heavy lifting, strenuous activities, or exercises that strain the belly muscles while you are recovering. Ask a family member or friend for help with housework, cooking, and shopping. This care sheet gives you a general idea about how long it will take for you to recover. But each person recovers at a different pace. Follow the steps below to get better as quickly as possible. How can you care for yourself at home? Activity    · Rest when you feel tired. Getting enough sleep will help you recover.     · Try to walk each day. Start by walking a little more than you did the day before. Bit by bit, increase the amount you walk.  Walking boosts blood flow and helps prevent pneumonia, constipation, and blood clots.     · Avoid strenuous activities, such as bicycle riding, jogging, weightlifting, and aerobic exercise, for 6 weeks or until your doctor says it is okay.     · Until your doctor says it is okay, do not lift anything heavier than your baby.     · Do not do sit-ups or other exercises that strain the belly muscles for 6 weeks or until your doctor says it is okay.     · Hold a pillow over your incision when you cough or take deep breaths. This will support your belly and decrease your pain.     · You may shower as usual. Pat the incision dry when you are done.     · You will have some vaginal bleeding. Wear sanitary pads. Do not douche or use tampons until your doctor says it is okay.     · Ask your doctor when you can drive again.     · You will probably need to take at least 6 weeks off work. It depends on the type of work you do and how you feel.     · Ask your doctor when it is okay for you to have sex. Diet    · You can eat your normal diet. If your stomach is upset, try bland, low-fat foods like plain rice, broiled chicken, toast, and yogurt.     · Drink plenty of fluids (unless your doctor tells you not to).     · You may notice that your bowel movements are not regular right after your surgery. This is common. Try to avoid constipation and straining with bowel movements. You may want to take a fiber supplement every day. If you have not had a bowel movement after a couple of days, ask your doctor about taking a mild laxative.     · If you are breastfeeding, limit alcohol. Alcohol can cause a lack of energy and other health problems for the baby when a breastfeeding woman drinks heavily. It can also get in the way of a mom's ability to feed her baby or to care for the child in other ways. There isn't a lot of research about exactly how much alcohol can harm a baby. Having no alcohol is the safest choice for your baby.  If you choose to have a drink now and then, have only one drink, and limit the number of occasions that you have a drink. Wait to breastfeed at least 2 hours after you have a drink to reduce the amount of alcohol the baby may get in the milk. Medicines    · Your doctor will tell you if and when you can restart your medicines. He or she will also give you instructions about taking any new medicines.     · If you take blood thinners, such as warfarin (Coumadin), clopidogrel (Plavix), or aspirin, be sure to talk to your doctor. He or she will tell you if and when to start taking those medicines again. Make sure that you understand exactly what your doctor wants you to do.     · Take pain medicines exactly as directed. ? If the doctor gave you a prescription medicine for pain, take it as prescribed. ? If you are not taking a prescription pain medicine, ask your doctor if you can take an over-the-counter medicine.     · If you think your pain medicine is making you sick to your stomach:  ? Take your medicine after meals (unless your doctor has told you not to). ? Ask your doctor for a different pain medicine.     · If your doctor prescribed antibiotics, take them as directed. Do not stop taking them just because you feel better. You need to take the full course of antibiotics. Incision care    · If you have strips of tape on the incision, leave the tape on for a week or until it falls off.     · Wash the area daily with warm, soapy water, and pat it dry. Don't use hydrogen peroxide or alcohol, which can slow healing. You may cover the area with a gauze bandage if it weeps or rubs against clothing. Change the bandage every day.     · Keep the area clean and dry. Other instructions    · If you breastfeed your baby, you may be more comfortable while you are healing if you place the baby so that he or she is not resting on your belly. Try tucking your baby under your arm, with his or her body along the side you will be feeding on.  Support your baby's upper body with your arm. With that hand you can control your baby's head to bring his or her mouth to your breast. This is sometimes called the football hold. Follow-up care is a key part of your treatment and safety. Be sure to make and go to all appointments, and call your doctor if you are having problems. It's also a good idea to know your test results and keep a list of the medicines you take. When should you call for help? Call 911 anytime you think you may need emergency care. For example, call if:    · You passed out (lost consciousness).     · You have chest pain, are short of breath, or cough up blood.    Call your doctor now or seek immediate medical care if:    · You have pain that does not get better after you take pain medicine.     · You have severe vaginal bleeding.     · You are dizzy or lightheaded, or you feel like you may faint.     · You have new or worse pain in your belly or pelvis.     · You have loose stitches, or your incision comes open.     · You have symptoms of infection, such as:  ? Increased pain, swelling, warmth, or redness. ? Red streaks leading from the incision. ? Pus draining from the incision. ? A fever.     · You have symptoms of a blood clot in your leg (called a deep vein thrombosis), such as:  ? Pain in your calf, back of the knee, thigh, or groin. ? Redness and swelling in your leg or groin.    Watch closely for changes in your health, and be sure to contact your doctor if:    · You do not get better as expected. Where can you learn more? Go to http://evelyn-kaitlyn.info/. Enter M806 in the search box to learn more about \" Section: What to Expect at Home. \"  Current as of: 2017  Content Version: 11.8  © 1010-3637 [employer]. Care instructions adapted under license by [employer] (which disclaims liability or warranty for this information).  If you have questions about a medical condition or this instruction, always ask your healthcare professional. [place] [address] any warranty or liability for your use of this information.

## 2018-11-24 NOTE — PROGRESS NOTES
Called Dr. Anupama Paul , Orders received to discharge home and jade Dr. Patrick Kahn office on Monday to schedule follow up appointment.

## 2018-11-28 NOTE — DISCHARGE SUMMARY
Date of Admission:  2018  9:33 AM  Date of Discharge:  2018  2:07 PM    Patient is a 29 y.o.  at 39w0d wks who was admitted for repeat . The indication for  was previous C/S. A Low Transverse  was performed with normal amount of blood loss. On post-op day #1, the patient had her catheter removed and was ambulating well in the [place]. Her post operative hemoglobin was stable. Patient had a normal post operative course. Incision stayed clean and dry, without erythema. Patient remained afebrile throughout the entire hospital stay. On day of discharge, she was discharged home in good condition with routine post  instructions. Pt was scheduled to follow up in two weeks for an incision check at [address]. She was breast feeding the infant and plans on P4 method for birth control. Discharge Meds:    Discharge Medication List as of 2018 11:33 AM      START taking these medications    Details   ibuprofen (MOTRIN) 600 mg tablet Take 1 Tab by mouth every six (6) hours as needed for Pain., Normal, Disp-60 Tab, R-2      ondansetron (ZOFRAN ODT) 4 mg disintegrating tablet Take 1 Tab by mouth every six (6) hours as needed for Nausea., Normal, Disp-20 Tab, R-1      oxyCODONE-acetaminophen (PERCOCET) 5-325 mg per tablet Take 1 Tab by mouth every four (4) hours as needed for Pain. Max Daily Amount: 6 Tabs., Print, Disp-24 Tab, R-0         CONTINUE these medications which have NOT CHANGED    Details   ferrous sulfate 325 mg (65 mg iron) tablet Take  by mouth Daily (before breakfast). , Historical Med      docusate sodium (COLACE) 100 mg capsule Take 1 Cap by mouth two (2) times daily as needed for Constipation for up to 90 days. , Normal, Disp-60 Cap, R-2      prenatal vit-iron fumarate-fa 27 mg iron- 0.8 mg tab tablet Take 1 Tab by mouth daily. , Normal, Disp-90 Tab, R-3             Jordi Garza MD  11:20 AM  18

## 2018-12-05 PROBLEM — O34.219 PREVIOUS CESAREAN DELIVERY, ANTEPARTUM: Status: RESOLVED | Noted: 2018-05-17 | Resolved: 2018-12-05

## 2018-12-05 PROBLEM — Z98.891 STATUS POST REPEAT LOW TRANSVERSE CESAREAN SECTION: Status: RESOLVED | Noted: 2018-11-21 | Resolved: 2018-12-05

## 2018-12-05 PROBLEM — O28.5 ABNORMAL GENETIC TEST IN PREGNANCY: Status: RESOLVED | Noted: 2018-06-19 | Resolved: 2018-12-05

## 2018-12-05 PROBLEM — Z09 POSTOPERATIVE EXAMINATION: Status: ACTIVE | Noted: 2018-12-05

## 2018-12-05 PROBLEM — Z30.09 CONTRACEPTIVE EDUCATION: Status: ACTIVE | Noted: 2018-12-05

## 2018-12-05 PROBLEM — Z34.83 MULTIGRAVIDA IN THIRD TRIMESTER: Status: RESOLVED | Noted: 2018-05-17 | Resolved: 2018-12-05

## 2018-12-05 PROBLEM — O99.013 ANEMIA IN PREGNANCY, THIRD TRIMESTER: Status: RESOLVED | Noted: 2018-09-12 | Resolved: 2018-12-05

## 2018-12-05 PROBLEM — O26.893 RH NEGATIVE STATUS DURING PREGNANCY IN THIRD TRIMESTER: Status: RESOLVED | Noted: 2018-05-18 | Resolved: 2018-12-05

## 2018-12-05 PROBLEM — Z67.91 RH NEGATIVE STATUS DURING PREGNANCY IN THIRD TRIMESTER: Status: RESOLVED | Noted: 2018-05-18 | Resolved: 2018-12-05

## 2018-12-05 PROBLEM — L98.9 SKIN LESION OF BACK: Status: RESOLVED | Noted: 2018-10-10 | Resolved: 2018-12-05

## 2019-01-03 PROBLEM — Z09 POSTOPERATIVE EXAMINATION: Status: RESOLVED | Noted: 2018-12-05 | Resolved: 2019-01-03

## 2019-01-03 PROBLEM — Z30.09 CONTRACEPTIVE EDUCATION: Status: RESOLVED | Noted: 2018-12-05 | Resolved: 2019-01-03

## 2020-11-13 PROBLEM — Z98.891 PREVIOUS CESAREAN SECTION: Status: ACTIVE | Noted: 2020-11-13

## 2020-11-13 PROBLEM — Z34.82 MULTIGRAVIDA IN SECOND TRIMESTER: Status: ACTIVE | Noted: 2020-11-13

## 2020-11-13 PROBLEM — O26.899 RH NEGATIVE STATE IN ANTEPARTUM PERIOD: Status: ACTIVE | Noted: 2020-11-13

## 2020-11-13 PROBLEM — Z67.91 RH NEGATIVE STATE IN ANTEPARTUM PERIOD: Status: ACTIVE | Noted: 2020-11-13

## 2020-11-14 ENCOUNTER — HOSPITAL ENCOUNTER (OUTPATIENT)
Age: 30
Discharge: HOME | End: 2020-11-14
Attending: OBSTETRICS & GYNECOLOGY | Admitting: OBSTETRICS & GYNECOLOGY
Payer: MEDICAID

## 2020-11-14 VITALS — DIASTOLIC BLOOD PRESSURE: 60 MMHG | HEART RATE: 71 BPM | SYSTOLIC BLOOD PRESSURE: 109 MMHG | TEMPERATURE: 98 F

## 2020-11-14 PROBLEM — O26.892 ABDOMINAL PAIN DURING PREGNANCY IN SECOND TRIMESTER: Status: ACTIVE | Noted: 2020-11-14

## 2020-11-14 PROBLEM — R10.9 ABDOMINAL PAIN DURING PREGNANCY IN SECOND TRIMESTER: Status: ACTIVE | Noted: 2020-11-14

## 2020-11-14 PROCEDURE — 76817 TRANSVAGINAL US OBSTETRIC: CPT

## 2020-11-14 PROCEDURE — 99282 EMERGENCY DEPT VISIT SF MDM: CPT

## 2020-11-14 NOTE — PROGRESS NOTES
Dr Leavitt [employer] at bedside. TV US. Ovaries viewed. Likely round ligament pain. May take Tylenol or tylenol PM for pain.

## 2020-11-14 NOTE — H&P
History & Physical    Name: Demian Puente MRN: [id number]  SSN: [id number]    YOB: 1990  Age: 27 y.o. Sex: female        Subjective: LLQ pain  26 yo  presents at 15+3wks with LLQ pain starting at 0400 today. Reports that she awoke with the pain and was unable to get back to sleep. Denies vb. Denies constipation. Denies N/V. Had [address] yesterday which showed a gonzalez IUP, breech, posterior placenta. No comment was made regarding the ovaries. Reports that pain is worse with cough or sneeze. Denies urinary symptoms.      Estimated Date of Delivery: 21  OB History    Para Term  AB Living   4 2 2   1 2   SAB TAB Ectopic Molar Multiple Live Births           0 2      # Outcome Date GA Lbr Jesus/2nd Weight Sex Delivery Anes PTL Lv   4 Current            3 Term 18 39w0d  3.11 kg F CS-LTranv SPINAL AN N LEEROY   2 AB 2017 5w0d          1 Term 11/15/12 40w2d  3.49 kg F  LO SPINAL AN  LEEROY      Complications: Failure to Progress in Second Stage       Past Medical History:   Diagnosis Date    Anemia NEC      Past Surgical History:   Procedure Laterality Date    BREAST SURGERY PROCEDURE UNLISTED       DELIVERY ONLY  ,     LTCS     HX BREAST AUGMENTATION      HX  SECTION      HX OTHER SURGICAL  2006    jaw surgery     Social History     Occupational History    Not on file   Tobacco Use    Smoking status: Never Smoker    Smokeless tobacco: Never Used   Substance and Sexual Activity    Alcohol use: No    Drug use: No     Comment: hx marajuan use    Sexual activity: Yes     Partners: Male     Birth control/protection: None     Family History   Problem Relation Age of Onset    No Known Problems Father     No Known Problems Mother     Breast Cancer Neg Hx     Ovarian Cancer Neg Hx     Uterine Cancer Neg Hx     Colon Cancer Neg Hx        Allergies   Allergen Reactions    Codeine Nausea and Vomiting    Morphine Nausea and Vomiting    Zyrtec [Cetirizine] Rash     Prior to Admission medications    Medication Sig Start Date End Date Taking? Authorizing Provider   prenatal vit-iron fumarate-fa 27 mg iron- 0.8 mg tab tablet Take 1 Tab by mouth daily. 18   Nakia Tracey NP        Review of Systems: Pertinent items are noted in HPI. Pt feels anxious regarding LLQ pain in pregnancy. 10 point ROS is otherwise negative. Objective:     Vitals:  Vitals:    20 1122   BP: 109/60   Pulse: 71   Temp: 98 °F (36.7 °C)        Physical Exam:  Patient without distress. Abdomen: soft, mild tenderness to deep palpation LLQ, otherwise nontender  Fundus: soft and non tender  Perineum: blood absent, amniotic fluid absent  Lower Extremities:  - Edema No  TVUS: Active fetus, breech position, normal FCA, normal appearing posterior placenta, normal appearing left ovary, right ovary not seen    Prenatal Labs:   Lab Results   Component Value Date/Time    ABO/Rh(D) A NEGATIVE 2018 10:07 AM    Rubella, External Immune 2012    GrBStrep, External negative 10/12/2012    HBsAg, External negative 2012    HIV, External negative 2012    RPR, External Non-Reactive 2012    Gonorrhea, External negative 2012    Chlamydia, External negative 2012    ABO,Rh A NEG 11/15/2012         Assessment/Plan: 28 yo  with LLQ pain likely due to round ligament stretching in pregnancy. Active Problems:    * No active hospital problems.  *       Plan:   Routine pregnancy precautions  May use tylenol or heating pad  No new meds

## 2020-11-14 NOTE — PROGRESS NOTES
's. States she had a US yesterday and \"everything was fine\". Began having left lower quadrant pain last PM.  VSS. Denies bleeding or fluid leakage. Abd palpated soft. Last BM yesterday.

## 2020-11-19 PROBLEM — O99.012 ANEMIA AFFECTING PREGNANCY IN SECOND TRIMESTER: Status: ACTIVE | Noted: 2020-11-19

## 2020-12-11 PROBLEM — R10.9 ABDOMINAL PAIN DURING PREGNANCY IN SECOND TRIMESTER: Status: RESOLVED | Noted: 2020-11-14 | Resolved: 2020-12-11

## 2020-12-11 PROBLEM — O35.DXX1 ECHOGENIC FOCUS OF BOWEL OF FETUS AFFECTING ANTEPARTUM CARE OF MOTHER, FETUS 1: Status: ACTIVE | Noted: 2020-12-11

## 2020-12-11 PROBLEM — O26.892 ABDOMINAL PAIN DURING PREGNANCY IN SECOND TRIMESTER: Status: RESOLVED | Noted: 2020-11-14 | Resolved: 2020-12-11

## 2020-12-22 PROBLEM — O35.3XX0: Status: ACTIVE | Noted: 2020-12-11

## 2020-12-22 PROBLEM — O09.92 HIGH-RISK PREGNANCY IN SECOND TRIMESTER: Status: ACTIVE | Noted: 2020-11-13

## 2020-12-22 PROBLEM — O34.219 PREVIOUS CESAREAN DELIVERY, ANTEPARTUM CONDITION OR COMPLICATION: Status: ACTIVE | Noted: 2020-11-13

## 2020-12-22 PROBLEM — O35.9XX0 SUSPECTED FETAL ABNORMALITY AFFECTING MANAGEMENT OF MOTHER, ANTEPARTUM: Status: ACTIVE | Noted: 2020-12-11

## 2020-12-22 PROBLEM — O35.DXX0 ECHOGENIC FOCUS OF BOWEL OF FETUS AFFECTING ANTEPARTUM CARE OF MOTHER: Status: ACTIVE | Noted: 2020-12-11

## 2021-01-03 ENCOUNTER — HOSPITAL ENCOUNTER (OUTPATIENT)
Age: 31
Discharge: HOME | DRG: 560 | End: 2021-01-03
Attending: OBSTETRICS & GYNECOLOGY | Admitting: OBSTETRICS & GYNECOLOGY
Payer: MEDICAID

## 2021-01-03 VITALS
RESPIRATION RATE: 18 BRPM | HEART RATE: 97 BPM | DIASTOLIC BLOOD PRESSURE: 68 MMHG | TEMPERATURE: 98.4 F | SYSTOLIC BLOOD PRESSURE: 119 MMHG

## 2021-01-03 PROBLEM — O36.4XX1 FETAL DEMISE, GREATER THAN 22 WEEKS, ANTEPARTUM, FETUS 1: Status: ACTIVE | Noted: 2021-01-03

## 2021-01-03 PROBLEM — O36.8130 DECREASED FETAL MOVEMENT AFFECTING MANAGEMENT OF PREGNANCY IN THIRD TRIMESTER: Status: ACTIVE | Noted: 2021-01-03

## 2021-01-03 PROCEDURE — 76815 OB US LIMITED FETUS(S): CPT

## 2021-01-03 PROCEDURE — 99284 EMERGENCY DEPT VISIT MOD MDM: CPT

## 2021-01-03 NOTE — PROGRESS NOTES
Pt to room [facility] for triage with chief complaint of decreased fetal movement. Unable to locate fetal heart rate with doppler. Dr Shakira Gannon called to assess patient.

## 2021-01-03 NOTE — H&P
Chief Complaint   Patient presents with    Decreased Fetal Movement     22w4d       27 y.o. female [id number] at 22w4d  weeks gestation who requests evaluation for decreased fm since last night  Her pregnancy issues include: she has been followed for cmv infection, fetus with lagging hc, liver bowel and ic calcifications, ascites. 2 prior c/s.  No s/s ptl      HISTORY:  OB History    Para Term  AB Living   4 2 2   1 2   SAB TAB Ectopic Molar Multiple Live Births           0 2      # Outcome Date GA Lbr Jesus/2nd Weight Sex Delivery Anes PTL Lv   4 Current            3 Term 18 39w0d  3.11 kg F CS-LTranv SPINAL AN N LEEROY   2 AB 2017 5w0d          1 Term 11/15/12 40w2d  3.49 kg F  LO SPINAL AN  LEEROY      Complications: Failure to Progress in Second Stage       Past Surgical History:   Procedure Laterality Date    HX BREAST AUGMENTATION      HX  SECTION      HX OTHER SURGICAL  2006    jaw surgery    [place] BREAST SURGERY PROCEDURE UNLISTED      [place]  DELIVERY ONLY  ,     LTCS        Past Medical History:   Diagnosis Date    Anemia NEC        Allergies   Allergen Reactions    Codeine Nausea and Vomiting    Morphine Nausea and Vomiting    Zyrtec [Cetirizine] Rash       Family History   Problem Relation Age of Onset    No Known Problems Father     No Known Problems Mother     Breast Cancer Neg Hx     Ovarian Cancer Neg Hx     Uterine Cancer Neg Hx     Colon Cancer Neg Hx        Social History     Socioeconomic History    Marital status:      Spouse name: Not on file    Number of children: Not on file    Years of education: Not on file    Highest education level: Not on file   Occupational History    Not on file   Social Needs    Financial resource strain: Not on file    Food insecurity     Worry: Not on file     Inability: Not on file    Transportation needs     Medical: Not on file     Non-medical: Not on file   Tobacco Use    Smoking status: Never Smoker    Smokeless tobacco: Never Used   Substance and Sexual Activity    Alcohol use: No    Drug use: No     Comment: hx marajuan use    Sexual activity: Yes     Partners: Male     Birth control/protection: None   Lifestyle    Physical activity     Days per week: Not on file     Minutes per session: Not on file    Stress: Not on file   Relationships    Social connections     Talks on phone: Not on file     Gets together: Not on file     Attends Holiness service: Not on file     Active member of club or organization: Not on file     Attends meetings of clubs or organizations: Not on file     Relationship status: Not on file    Intimate partner violence     Fear of current or ex partner: Not on file     Emotionally abused: Not on file     Physically abused: Not on file     Forced sexual activity: Not on file   Other Topics Concern     Service Not Asked    Blood Transfusions Not Asked    Caffeine Concern No    Occupational Exposure Not Asked    Hobby Hazards Not Asked    Sleep Concern Not Asked    Stress Concern Not Asked    Weight Concern Not Asked    Special Diet Not Asked    Back Care Not Asked    Exercise No    Bike Helmet Not Asked    Seat Belt Yes    Self-Exams No   Social History Narrative    Abuse: Feels safe at home, no history of physical abuse, no history of sexual abuse       ROS:  Negative:   negative 10 point ROS except as noted in HPI    Positive:   per hpi    PHYSICAL EXAM:  Blood pressure 119/68, pulse 97, temperature 98.4 °F (36.9 °C), resp. rate 18, last menstrual period 07/29/2020, not currently breastfeeding. The patient appears well, alert, oriented x 3. Appropriate affect. Lungs are clear. Heart RRR, no murmurs. Abdomen soft, non-tender, no rebound/guarding, normoactive bs. Fundus soft and non tender  Skin warm, dry, no rashes  Ext no edema, DTR's normal    Cervix: deferred    Ob us: gonzalez iup, no fca.  Confirmed by dr. Laron Drake  I have personally reviewed the patient's history, prenatal record, and pertinent test results. vital sign trends, radiology reports, laboratory results, previous provider notes support my clinical impression. Assessment:  27 y.o. female at 18w2d  fetal demise at 22.4 weeks, cmv infection this pregnancy. Plan:  Findings and condolences given to patient. Options for management discussed at length. Dr. Mamie Thomas consulted with patient. Will go home and come back for induction tomorrow.        Signed By:  Claudeen Sees, MD     January 3, 2021

## 2021-01-03 NOTE — PROGRESS NOTES
Patient discharged home per MD order. Discharge instructions completed and patient verbalized understanding. Questions encouraged. Pt will return for induction tomorrow. Will call in AM. Accompanied by . Stable at discharge.

## 2021-01-03 NOTE — DISCHARGE INSTRUCTIONS
Patient Education        Stillbirth (Before Delivery): Care Instructions  Your Care Instructions     Stillbirth is the loss of a baby after 20 weeks of pregnancy. When a baby dies while still in the womb, this may also be called fetal loss. A doctor may deliver the baby by giving you medicine to start labor. Or you may have a surgical procedure called D&E (dilation and evacuation). The loss of a baby is devastating and very hard to accept. You may wonder why it happened or blame yourself. But fetal loss can happen even during a pregnancy that has been going well. In the weeks to come, try to take care of yourself physically and emotionally. Take care of yourself in whatever way feels best.  Follow-up care is a key part of your treatment and safety. Be sure to make and go to all appointments, and call your doctor if you are having problems. It's also a good idea to know your test results and keep a list of the medicines you take. What happens next? After a fetus dies, labor will usually begin on its own within 2 weeks. Many women don't want to wait that long. They choose to have labor induced. This means going to the hospital and, usually, getting medicine that starts the labor process. If labor doesn't start on its own, your doctor may take steps to get your labor going. · Your doctor may use medicine to soften your cervix and help it begin to open. · Then your doctor probably will give you medicine to start labor and keep your labor going. · You will be given medicine for pain if you need it. After delivery, you will probably be able to see the baby if you want to. Although this can be very hard, some parents want the chance to hold the baby and say goodbye. You will probably go home the next day. Surgery instead of labor   Some women may be able to choose surgery (D&E) instead of going through labor. Your doctor will discuss whether this is an option for you.   Delivery by  section is rare in fetal loss. It is major surgery, so it's only done when going through labor would be more dangerous. Deciding about autopsy   If the exact cause of death isn't known, you may face a decision about whether to have an autopsy. This can be a hard decision. But an autopsy may help you find out why this terrible loss happened to you and whether it could happen again. How can you care for yourself at home? After the delivery, there are things you can do for your physical health and comfort. Taking care of your body   · Use pads instead of tampons for the bloody flow that may last as long as 2 weeks. · Ask your doctor if you can take an over-the-counter pain medicine, such as acetaminophen (Tylenol), ibuprofen (Advil, Motrin), or naproxen (Aleve), to ease cramps. Be safe with medicines. Read and follow all instructions on the label. · Ask your doctor about when it is okay to have sex. Many doctors recommend waiting about 4 to 6 weeks. This gives your body time to heal.  · If your milk has started to come in, talk to your doctor about how to ease discomfort. Dealing with your grief   · Rest whenever you can. Being tired makes it harder to handle your emotions. · Tell your family and friends what they can do. You may want to spend time alone, or you may seek the comfort of family and friends. · Try to eat healthy foods, get some sleep, and get exercise (or just get out of the house) to help you feel strong as you heal.  · Talk to your doctor about how you are coping. He or she will want to watch you for signs of depression. You may want to have counseling for support and to help you express your feelings. · Think about making a memory book of your pregnancy and baby. Many parents name their baby and want to take pictures and keep a lock of hair. The hospital may take photos or footprints for you. Some parents have a ceremony, such as a christening or other blessing or a  service.   · If you can, try to talk to others who have gone through this loss. You can make connections online or in person. Here are some organizations that can help:  ? The Compassionate Friends. This is a resource for people who have lost a child. The group can help put you in touch with one of its support groups in your area. The website is www.compassionatefriends. org.  ? Share (Pregnancy and Infant Loss [address] [place] [address].). This group can offer advice and connections to others who have lost a child. The group's website is www.nationalshNanigans.org.  ? The International Stillbirth [employer]. This group offers information and resources. The website is www.stillbirthalliance.org. When should you call for help? Call 911 anytime you think you may need emergency care. For example, call if:    · You passed out (lost consciousness).     · You have severe vaginal bleeding.     · You have severe pain in your belly or pelvis. Call your doctor now or seek immediate medical care if:    · You have signs of preeclampsia, such as:  ? Sudden swelling of your face, hands, or feet. ? New vision problems (such as dimness, blurring, or seeing spots). ? A severe headache.     · You have a fever.     · You have belly pain or cramping.     · You have any vaginal bleeding.     · You have had regular contractions (with or without pain) for an hour. This means that you have 8 or more within 1 hour or 4 or more in 20 minutes after you change your position and drink fluids.     · You have a sudden release of fluid from your vagina.     · You have low back pain or pelvic pressure that does not go away. Watch closely for changes in your health, and be sure to contact your doctor if you have any problems. Where can you learn more? Go to http://www.gray.com/  Enter B273 in the search box to learn more about \"Stillbirth (Before Delivery): Care Instructions. \"  Current as of: February 11, 2020               Content Version: 12.6  © 9357-6657 Healthwise, Incorporated. Care instructions adapted under license by [employer] (which disclaims liability or warranty for this information). If you have questions about a medical condition or this instruction, always ask your healthcare professional. [address] any warranty or liability for your use of this information.

## 2021-01-04 ENCOUNTER — ANESTHESIA (OUTPATIENT)
Dept: LABOR AND DELIVERY | Age: 31
DRG: 560 | End: 2021-01-04
Payer: MEDICAID

## 2021-01-04 ENCOUNTER — HOSPITAL ENCOUNTER (INPATIENT)
Age: 31
LOS: 2 days | Discharge: HOME | DRG: 560 | End: 2021-01-06
Attending: OBSTETRICS & GYNECOLOGY | Admitting: OBSTETRICS & GYNECOLOGY
Payer: MEDICAID

## 2021-01-04 ENCOUNTER — ANESTHESIA EVENT (OUTPATIENT)
Dept: LABOR AND DELIVERY | Age: 31
DRG: 560 | End: 2021-01-04
Payer: MEDICAID

## 2021-01-04 DIAGNOSIS — O36.4XX0 FETAL DEMISE > 22 WEEKS, DELIVERED, CURRENT HOSPITALIZATION: Primary | ICD-10-CM

## 2021-01-04 LAB
ABO + RH BLD: NORMAL
BLOOD BANK CMNT PATIENT-IMP: NORMAL
BLOOD GROUP ANTIBODIES SERPL: NORMAL
BLOOD GROUP ANTIBODIES SERPL: NORMAL
ERYTHROCYTE [DISTWIDTH] IN BLOOD BY AUTOMATED COUNT: 14.8 % (ref 11.9–14.6)
HCT VFR BLD AUTO: 30.1 % (ref 35.8–46.3)
HGB BLD-MCNC: 10.2 G/DL (ref 11.7–15.4)
MCH RBC QN AUTO: 29.6 PG (ref 26.1–32.9)
MCHC RBC AUTO-ENTMCNC: 33.9 G/DL (ref 31.4–35)
MCV RBC AUTO: 87.2 FL (ref 79.6–97.8)
NRBC # BLD: 0 K/UL (ref 0–0.2)
PLATELET # BLD AUTO: 195 K/UL (ref 150–450)
PMV BLD AUTO: 10.2 FL (ref 9.4–12.3)
RBC # BLD AUTO: 3.45 M/UL (ref 4.05–5.2)
SPECIMEN EXP DATE BLD: NORMAL
WBC # BLD AUTO: 6.8 K/UL (ref 4.3–11.1)
WEAK D AG RBC QL: NORMAL

## 2021-01-04 PROCEDURE — A4300 CATH IMPL VASC ACCESS PORTAL: HCPCS | Performed by: NURSE ANESTHETIST, CERTIFIED REGISTERED

## 2021-01-04 PROCEDURE — 3E0P7VZ INTRODUCTION OF HORMONE INTO FEMALE REPRODUCTIVE, VIA NATURAL OR ARTIFICIAL OPENING: ICD-10-PCS | Performed by: ANESTHESIOLOGY

## 2021-01-04 PROCEDURE — 86850 RBC ANTIBODY SCREEN: CPT

## 2021-01-04 PROCEDURE — 00HU33Z INSERTION OF INFUSION DEVICE INTO SPINAL CANAL, PERCUTANEOUS APPROACH: ICD-10-PCS | Performed by: ANESTHESIOLOGY

## 2021-01-04 PROCEDURE — 74011250637 HC RX REV CODE- 250/637: Performed by: OBSTETRICS & GYNECOLOGY

## 2021-01-04 PROCEDURE — 86901 BLOOD TYPING SEROLOGIC RH(D): CPT

## 2021-01-04 PROCEDURE — 65270000029 HC RM PRIVATE

## 2021-01-04 PROCEDURE — 86870 RBC ANTIBODY IDENTIFICATION: CPT

## 2021-01-04 PROCEDURE — 74011000250 HC RX REV CODE- 250: Performed by: ANESTHESIOLOGY

## 2021-01-04 PROCEDURE — 74011250636 HC RX REV CODE- 250/636: Performed by: OBSTETRICS & GYNECOLOGY

## 2021-01-04 PROCEDURE — 77030014125 HC TY EPDRL BBMI -B: Performed by: NURSE ANESTHETIST, CERTIFIED REGISTERED

## 2021-01-04 PROCEDURE — 85027 COMPLETE CBC AUTOMATED: CPT

## 2021-01-04 PROCEDURE — 2709999900 HC NON-CHARGEABLE SUPPLY

## 2021-01-04 PROCEDURE — 10907ZC DRAINAGE OF AMNIOTIC FLUID, THERAPEUTIC FROM PRODUCTS OF CONCEPTION, VIA NATURAL OR ARTIFICIAL OPENING: ICD-10-PCS | Performed by: ANESTHESIOLOGY

## 2021-01-04 PROCEDURE — 74011250636 HC RX REV CODE- 250/636: Performed by: NURSE ANESTHETIST, CERTIFIED REGISTERED

## 2021-01-04 RX ORDER — MISOPROSTOL 200 UG/1
400 TABLET ORAL EVERY 4 HOURS
Status: DISCONTINUED | OUTPATIENT
Start: 2021-01-04 | End: 2021-01-05

## 2021-01-04 RX ORDER — ROPIVACAINE HYDROCHLORIDE 2 MG/ML
INJECTION, SOLUTION EPIDURAL; INFILTRATION; PERINEURAL
Status: DISCONTINUED | OUTPATIENT
Start: 2021-01-04 | End: 2021-01-05 | Stop reason: HOSPADM

## 2021-01-04 RX ORDER — LOPERAMIDE HYDROCHLORIDE 2 MG/1
4 CAPSULE ORAL ONCE
Status: COMPLETED | OUTPATIENT
Start: 2021-01-04 | End: 2021-01-04

## 2021-01-04 RX ORDER — SODIUM CHLORIDE 0.9 % (FLUSH) 0.9 %
5-40 SYRINGE (ML) INJECTION AS NEEDED
Status: DISCONTINUED | OUTPATIENT
Start: 2021-01-04 | End: 2021-01-06 | Stop reason: HOSPADM

## 2021-01-04 RX ORDER — ROPIVACAINE HYDROCHLORIDE 2 MG/ML
INJECTION, SOLUTION EPIDURAL; INFILTRATION; PERINEURAL AS NEEDED
Status: DISCONTINUED | OUTPATIENT
Start: 2021-01-04 | End: 2021-01-05 | Stop reason: HOSPADM

## 2021-01-04 RX ORDER — ONDANSETRON 2 MG/ML
4 INJECTION INTRAMUSCULAR; INTRAVENOUS
Status: DISCONTINUED | OUTPATIENT
Start: 2021-01-04 | End: 2021-01-06 | Stop reason: HOSPADM

## 2021-01-04 RX ORDER — BUTORPHANOL TARTRATE 2 MG/ML
2 INJECTION INTRAMUSCULAR; INTRAVENOUS
Status: DISCONTINUED | OUTPATIENT
Start: 2021-01-04 | End: 2021-01-05

## 2021-01-04 RX ORDER — DEXTROSE, SODIUM CHLORIDE, SODIUM LACTATE, POTASSIUM CHLORIDE, AND CALCIUM CHLORIDE 5; .6; .31; .03; .02 G/100ML; G/100ML; G/100ML; G/100ML; G/100ML
125 INJECTION, SOLUTION INTRAVENOUS CONTINUOUS
Status: DISCONTINUED | OUTPATIENT
Start: 2021-01-04 | End: 2021-01-06 | Stop reason: HOSPADM

## 2021-01-04 RX ORDER — ACETAMINOPHEN 500 MG
1000 TABLET ORAL ONCE
Status: COMPLETED | OUTPATIENT
Start: 2021-01-04 | End: 2021-01-04

## 2021-01-04 RX ORDER — SODIUM CHLORIDE 0.9 % (FLUSH) 0.9 %
5-40 SYRINGE (ML) INJECTION EVERY 8 HOURS
Status: DISCONTINUED | OUTPATIENT
Start: 2021-01-04 | End: 2021-01-05

## 2021-01-04 RX ORDER — LIDOCAINE HYDROCHLORIDE AND EPINEPHRINE 15; 5 MG/ML; UG/ML
INJECTION, SOLUTION EPIDURAL
Status: COMPLETED | OUTPATIENT
Start: 2021-01-04 | End: 2021-01-04

## 2021-01-04 RX ADMIN — LOPERAMIDE HYDROCHLORIDE 4 MG: 2 CAPSULE ORAL at 20:24

## 2021-01-04 RX ADMIN — MISOPROSTOL 400 MCG: 200 TABLET ORAL at 12:11

## 2021-01-04 RX ADMIN — ACETAMINOPHEN 1000 MG: 500 TABLET ORAL at 20:24

## 2021-01-04 RX ADMIN — MISOPROSTOL 400 MCG: 200 TABLET ORAL at 20:05

## 2021-01-04 RX ADMIN — MISOPROSTOL 400 MCG: 200 TABLET ORAL at 08:03

## 2021-01-04 RX ADMIN — Medication 10 ML: at 08:00

## 2021-01-04 RX ADMIN — SODIUM CHLORIDE, SODIUM LACTATE, POTASSIUM CHLORIDE, CALCIUM CHLORIDE, AND DEXTROSE MONOHYDRATE 125 ML/HR: 600; 310; 30; 20; 5 INJECTION, SOLUTION INTRAVENOUS at 08:11

## 2021-01-04 RX ADMIN — ONDANSETRON 4 MG: 2 INJECTION INTRAMUSCULAR; INTRAVENOUS at 23:52

## 2021-01-04 RX ADMIN — MISOPROSTOL 400 MCG: 200 TABLET ORAL at 15:57

## 2021-01-04 RX ADMIN — SODIUM CHLORIDE, SODIUM LACTATE, POTASSIUM CHLORIDE, CALCIUM CHLORIDE, AND DEXTROSE MONOHYDRATE 125 ML/HR: 600; 310; 30; 20; 5 INJECTION, SOLUTION INTRAVENOUS at 22:30

## 2021-01-04 RX ADMIN — Medication 8 ML: at 22:45

## 2021-01-04 RX ADMIN — LIDOCAINE HYDROCHLORIDE,EPINEPHRINE BITARTRATE 3 ML: 15; .005 INJECTION, SOLUTION EPIDURAL; INFILTRATION; INTRACAUDAL; PERINEURAL at 22:23

## 2021-01-04 RX ADMIN — ROPIVACAINE HYDROCHLORIDE 8 ML/HR: 2 INJECTION, SOLUTION EPIDURAL; INFILTRATION at 22:45

## 2021-01-04 NOTE — PROGRESS NOTES
Patient admitted for IUFD - will be followed by . SW will remain available for any needs.     MARCO Ashford  [facility]

## 2021-01-04 NOTE — PROGRESS NOTES
DR Lynch Revelorraine on phone report of pt is having more irritability then before believes vaginal is the right route for this pt. Request to keep him updated on progress.

## 2021-01-04 NOTE — PROGRESS NOTES
Dr Cayetano Mcgill states pt can have clear liquids and some crackers as long as we are a few hours from an epidural

## 2021-01-04 NOTE — PROGRESS NOTES
orientated pt to room. Discussed plan of care for the day, IV started. Dr Alba Edward called and checked on pt.

## 2021-01-04 NOTE — PROGRESS NOTES
Initial visit with patient and . Went over purple  bereavement folder/brochures. Answered questions. Affirmed emotions. Patient/ are leaning towards cremation with [facility]. Contact information given. Will continue to follow as needed. Leo Davenport M.Div.

## 2021-01-04 NOTE — H&P
[address] [place] [address] OB H&P      Assessment/Plan    Problem List  Date Reviewed: 1/3/2021          Codes Class    Fetal demise in gonzalez pregnancy greater than 22 weeks gestation, antepartum ICD-10-CM: O36. 4XX0  ICD-9-CM: 656.43         Decreased fetal movement affecting management of pregnancy in third trimester ICD-10-CM: O36.8130  ICD-9-CM: 655.73         Fetal demise, greater than 22 weeks, antepartum, fetus 1 ICD-10-CM: O36. 4XX1  ICD-9-CM: 656.43         Fetal damage suspected from maternal viral disease in pregnancy ICD-10-CM: O35. 3XX0  ICD-9-CM: 655.30     Overview Addendum 2020  4:07 PM by David Hernandez MD     2020: echogenic bowel noted, along with ?fluid noted in bowel, referral to M. 2020 at [facility]: Sonographic findings c/w Congenital CMV Infection;including ascites, echogenic bowel, liver and periventricular calcifications and lagging head growth. Checking maternal IgG and IgM titers not really useful. Discussed doing amniocentesis to confirm diagnosis, but would not . No Treatments have shown any definite benefit to short or long term outcomes for the fetus. Some studies have suggested long term outcome improvements with high dose Valcyclovir but no definitive data or recommendations for its use. I explained the probability of some neurologic damage, specifically deafness was significant but there is a wide variation in  outcomes. With the presence of significant ascites, IUFD was possible. Questions answered. They are considering the option of Amniocentesis. I encouraged getting a second opinion from another [facility]. She wanted to try taking the Valtrex, even though benefit was unsure. Patient scheduled to return in 1 week and call for sooner appointment if desired. Rx for 8 grams Valtrex daily called in to pharmacy by MD to explain the high dose.     2020 at [facility]-Patient came in with her  today to discuss findings yesterday and have questions answered. US repeated by MD, findings shown and explained. Questions answered. Could not give precise outcome prediction. Tolerating Valtrex. Set up referral to [facility] for 2nd opinion. I called and talked today to Dr. Kelsey Drake, history and US findings given. · [facility] for 2nd opinion arranged. · Close monitoring with US at [facility] weekly. · Possible amniocentesis for CMV PCR. · Continue 8 grams Valtrex daily, 4 grams bid. Anemia affecting pregnancy in second trimester ICD-10-CM: O99.012  ICD-9-CM: 648.23, 285.9     Overview Addendum 2020  4:36 PM by Neil Zavala RN     Add'l Iron             Previous  delivery, antepartum condition or complication PEL-06-FS: Y23.331  ICD-9-CM: 654.23     Overview Addendum 2020  4:37 PM by Neil Zavala RN     CSx2, repeat when indicated               High-risk pregnancy in second trimester ICD-10-CM: O09.92  ICD-9-CM: V23.9     Overview Addendum 2020  4:37 PM by Neil Zavala RN     NIDIA 2021 by LMP c/w 15 wk US    2018: CF, SMA negative  2020: NIPT negx3, female, AFP negative               Rh negative state in antepartum period ICD-10-CM: O26.899, Z67.91  ICD-9-CM: 646.83     Overview Signed 2020 10:31 AM by Bassam Pillai, NP     Rhogam at 28 weeks                   [address] [place] [address] Blvd 27 y.o. [place]  22w5d  presented to L&D for IOL for IUGR. Pt has no complaints today. Pt denies vaginal bleeding/discharge. No LOF.     OB History    Para Term  AB Living   4 2 2   1 2   SAB TAB Ectopic Molar Multiple Live Births           0 2      # Outcome Date GA Lbr Jesus/2nd Weight Sex Delivery Anes PTL Lv   4 Current            3 Term 18 39w0d  6 lb 13.7 oz (3.11 kg) F CS-LTranv SPINAL AN N LEEROY   2 AB 2017 5w0d          1 Term 11/15/12 40w2d  7 lb 11.1 oz (3.49 kg) F  LO SPINAL AN  LEEROY      Complications: Failure to Progress in Second Stage       Past Medical History: Diagnosis Date    Anemia NEC        Past Surgical History:   Procedure Laterality Date    HX BREAST AUGMENTATION      HX  SECTION      HX OTHER SURGICAL  2006    jaw surgery    DC BREAST SURGERY PROCEDURE UNLISTED      DC  DELIVERY ONLY  2018    LTCS        Family History   Problem Relation Age of Onset    No Known Problems Father     No Known Problems Mother     Breast Cancer Neg Hx     Ovarian Cancer Neg Hx     Uterine Cancer Neg Hx     Colon Cancer Neg Hx        Social History     Socioeconomic History    Marital status:      Spouse name: Not on file    Number of children: Not on file    Years of education: Not on file    Highest education level: Not on file   Occupational History    Not on file   Social Needs    Financial resource strain: Not on file    Food insecurity     Worry: Not on file     Inability: Not on file    Transportation needs     Medical: Not on file     Non-medical: Not on file   Tobacco Use    Smoking status: Never Smoker    Smokeless tobacco: Never Used   Substance and Sexual Activity    Alcohol use: No    Drug use: No     Comment: hx marajuan use    Sexual activity: Yes     Partners: Male     Birth control/protection: None   Lifestyle    Physical activity     Days per week: Not on file     Minutes per session: Not on file    Stress: Not on file   Relationships    Social connections     Talks on phone: Not on file     Gets together: Not on file     Attends Gnosticist service: Not on file     Active member of club or organization: Not on file     Attends meetings of clubs or organizations: Not on file     Relationship status: Not on file    Intimate partner violence     Fear of current or ex partner: Not on file     Emotionally abused: Not on file     Physically abused: Not on file     Forced sexual activity: Not on file   Other Topics Concern     Service Not Asked    Blood Transfusions Not Asked    Caffeine Concern No    Occupational Exposure Not Asked    Hobby Hazards Not Asked    Sleep Concern Not Asked    Stress Concern Not Asked    Weight Concern Not Asked    Special Diet Not Asked    Back Care Not Asked    Exercise No    Bike Helmet Not Asked    Seat Belt Yes    Self-Exams No   Social History Narrative    Abuse: Feels safe at home, no history of physical abuse, no history of sexual abuse       Allergies   Allergen Reactions    Codeine Nausea and Vomiting    Morphine Nausea and Vomiting    Zyrtec [Cetirizine] Rash         Review of Systems:    Constitutional: No fevers or chills     CV: No chest pain or palpatations    Resp: No SOB or cough    GI: No nausea/vomiting/diarrhea/constipation    Neuro: No HA, no seizure like activity    Skin: No rashes or lesions     Breast: No breast pain    : No dysuria or hematuria      Prenatal Record Review    The prenatal record has been reviewed.     Prenatal Labs:   Lab Results   Component Value Date/Time    Rubella, External Immune 04/04/2012    GrBStrep, External negative 10/12/2012    HBsAg, External negative 04/04/2012    HIV, External negative 04/04/2012    RPR, External Non-Reactive 04/04/2012    Gonorrhea, External negative 04/30/2012    Chlamydia, External negative 04/30/2012       Objective    Visit Vitals  LMP 07/29/2020 (Exact Date)         Physical Exam    Gen: alert and cooperative, NAD    HEENT: NCAT    CV: RRR    RESP: CTA bilat    ABD: Gravid, soft, NT    EXT: trace edema bilat    NEURO: No focal deficits    SKIN: No noted rashes or lesions       Jos Resendez MD  8:06 AM  01/04/21

## 2021-01-04 NOTE — PROGRESS NOTES
Dr Verna Burrell on phone request RN to place cytotec vaginally. 1600 cytotec 400 mcg placed vagal sve 50%   1605 pt without complaints of pain but states she can feel cramping.

## 2021-01-05 PROCEDURE — 65270000029 HC RM PRIVATE

## 2021-01-05 PROCEDURE — 75410000002 HC LABOR FEE PER 1 HR: Performed by: OBSTETRICS & GYNECOLOGY

## 2021-01-05 PROCEDURE — 74011250636 HC RX REV CODE- 250/636: Performed by: OBSTETRICS & GYNECOLOGY

## 2021-01-05 PROCEDURE — 88307 TISSUE EXAM BY PATHOLOGIST: CPT

## 2021-01-05 PROCEDURE — 74011250636 HC RX REV CODE- 250/636: Performed by: NURSE ANESTHETIST, CERTIFIED REGISTERED

## 2021-01-05 PROCEDURE — 59409 OBSTETRICAL CARE: CPT | Performed by: OBSTETRICS & GYNECOLOGY

## 2021-01-05 PROCEDURE — 76060000078 HC EPIDURAL ANESTHESIA: Performed by: OBSTETRICS & GYNECOLOGY

## 2021-01-05 PROCEDURE — 74011000250 HC RX REV CODE- 250: Performed by: NURSE ANESTHETIST, CERTIFIED REGISTERED

## 2021-01-05 PROCEDURE — 75410000000 HC DELIVERY VAGINAL/SINGLE: Performed by: OBSTETRICS & GYNECOLOGY

## 2021-01-05 PROCEDURE — 75410000003 HC RECOV DEL/VAG/CSECN EA 0.5 HR: Performed by: OBSTETRICS & GYNECOLOGY

## 2021-01-05 PROCEDURE — 74011250637 HC RX REV CODE- 250/637: Performed by: OBSTETRICS & GYNECOLOGY

## 2021-01-05 RX ORDER — PRENATAL VIT 96/IRON FUM/FOLIC 27MG-0.8MG
1 TABLET ORAL DAILY
Status: DISCONTINUED | OUTPATIENT
Start: 2021-01-06 | End: 2021-01-06 | Stop reason: HOSPADM

## 2021-01-05 RX ORDER — HYDROCODONE BITARTRATE AND ACETAMINOPHEN 5; 325 MG/1; MG/1
1 TABLET ORAL
Status: DISCONTINUED | OUTPATIENT
Start: 2021-01-05 | End: 2021-01-06 | Stop reason: HOSPADM

## 2021-01-05 RX ORDER — DOCUSATE SODIUM 100 MG/1
100 CAPSULE, LIQUID FILLED ORAL
Status: DISCONTINUED | OUTPATIENT
Start: 2021-01-05 | End: 2021-01-06 | Stop reason: HOSPADM

## 2021-01-05 RX ORDER — IBUPROFEN 600 MG/1
600 TABLET ORAL
Status: DISCONTINUED | OUTPATIENT
Start: 2021-01-05 | End: 2021-01-06 | Stop reason: HOSPADM

## 2021-01-05 RX ORDER — LIDOCAINE HYDROCHLORIDE AND EPINEPHRINE 20; 5 MG/ML; UG/ML
INJECTION, SOLUTION EPIDURAL; INFILTRATION; INTRACAUDAL; PERINEURAL AS NEEDED
Status: DISCONTINUED | OUTPATIENT
Start: 2021-01-05 | End: 2021-01-05 | Stop reason: HOSPADM

## 2021-01-05 RX ORDER — OXYCODONE HYDROCHLORIDE 5 MG/1
5-10 TABLET ORAL
Status: DISCONTINUED | OUTPATIENT
Start: 2021-01-05 | End: 2021-01-06 | Stop reason: HOSPADM

## 2021-01-05 RX ORDER — LORAZEPAM 1 MG/1
1 TABLET ORAL
Qty: 14 TAB | Refills: 0 | Status: SHIPPED | OUTPATIENT
Start: 2021-01-05 | End: 2021-01-12

## 2021-01-05 RX ORDER — SIMETHICONE 80 MG
80 TABLET,CHEWABLE ORAL
Status: DISCONTINUED | OUTPATIENT
Start: 2021-01-05 | End: 2021-01-06 | Stop reason: HOSPADM

## 2021-01-05 RX ORDER — ZOLPIDEM TARTRATE 5 MG/1
5 TABLET ORAL
Status: DISCONTINUED | OUTPATIENT
Start: 2021-01-05 | End: 2021-01-06 | Stop reason: HOSPADM

## 2021-01-05 RX ORDER — FENTANYL CITRATE 50 UG/ML
INJECTION, SOLUTION INTRAMUSCULAR; INTRAVENOUS AS NEEDED
Status: DISCONTINUED | OUTPATIENT
Start: 2021-01-05 | End: 2021-01-05 | Stop reason: HOSPADM

## 2021-01-05 RX ORDER — OXYTOCIN/RINGER'S LACTATE 30/500 ML
500 PLASTIC BAG, INJECTION (ML) INTRAVENOUS ONCE
Status: COMPLETED | OUTPATIENT
Start: 2021-01-05 | End: 2021-01-05

## 2021-01-05 RX ORDER — DIPHENHYDRAMINE HCL 25 MG
25 CAPSULE ORAL
Status: DISCONTINUED | OUTPATIENT
Start: 2021-01-05 | End: 2021-01-06 | Stop reason: HOSPADM

## 2021-01-05 RX ORDER — ZOLPIDEM TARTRATE 5 MG/1
5 TABLET ORAL
Qty: 14 TAB | Refills: 0 | Status: SHIPPED | OUTPATIENT
Start: 2021-01-05 | End: 2021-01-12

## 2021-01-05 RX ORDER — HYDROCODONE BITARTRATE AND ACETAMINOPHEN 5; 325 MG/1; MG/1
1 TABLET ORAL
Qty: 18 TAB | Refills: 0 | Status: SHIPPED | OUTPATIENT
Start: 2021-01-05 | End: 2021-01-10

## 2021-01-05 RX ORDER — ACETAMINOPHEN 500 MG
1000 TABLET ORAL ONCE
Status: COMPLETED | OUTPATIENT
Start: 2021-01-05 | End: 2021-01-05

## 2021-01-05 RX ORDER — LORAZEPAM 1 MG/1
1 TABLET ORAL
Status: DISCONTINUED | OUTPATIENT
Start: 2021-01-05 | End: 2021-01-06 | Stop reason: HOSPADM

## 2021-01-05 RX ORDER — PROMETHAZINE HYDROCHLORIDE 25 MG/1
25 TABLET ORAL
Status: DISCONTINUED | OUTPATIENT
Start: 2021-01-05 | End: 2021-01-06 | Stop reason: HOSPADM

## 2021-01-05 RX ADMIN — LIDOCAINE HYDROCHLORIDE,EPINEPHRINE BITARTRATE 5 ML: 20; .005 INJECTION, SOLUTION EPIDURAL; INFILTRATION; INTRACAUDAL; PERINEURAL at 07:10

## 2021-01-05 RX ADMIN — FENTANYL CITRATE 100 MCG: 50 INJECTION INTRAMUSCULAR; INTRAVENOUS at 07:10

## 2021-01-05 RX ADMIN — Medication 30000 MILLI-UNITS/HR: at 10:26

## 2021-01-05 RX ADMIN — MISOPROSTOL 400 MCG: 200 TABLET ORAL at 00:00

## 2021-01-05 RX ADMIN — MISOPROSTOL 400 MCG: 200 TABLET ORAL at 08:04

## 2021-01-05 RX ADMIN — ACETAMINOPHEN 1000 MG: 500 TABLET ORAL at 10:04

## 2021-01-05 RX ADMIN — MISOPROSTOL 400 MCG: 200 TABLET ORAL at 04:01

## 2021-01-05 NOTE — PROGRESS NOTES
Subjective: Pt tolerating induction well.     Objective:    Vitals:    01/05/21 0339 01/05/21 0354 01/05/21 0401 01/05/21 0709   BP: (!) 102/56 (!) 97/54  (!) 92/51   Pulse: 61 64  70   Resp:    16   Temp:   99 °F (37.2 °C) 99.3 °F (37.4 °C)   Weight:       Height:         [place]:  irreg ctx    SVE:  2/80/-1  Membranes:  AROM - bloody fluid    Assessement and Plan: Boykin Felty 27 y.o. [place]  at 22w6d admitted for IOL for IUGR    Pain control: epidural    Will cont cytotec      Kezia Mendoza MD    7:57 AM    01/05/21

## 2021-01-05 NOTE — PROGRESS NOTES
Pt c/o chills and diarrhea. Temp slightly elevated due to Cytotec. Dr. Laura Macias was called and updated, orders received for 1 G of Tylenol and Imodium. No additional orders at this time.

## 2021-01-05 NOTE — PROGRESS NOTES
I was notified of patient's delivery of baby girl fetal demise. Talked with RN and received update. Visit attempted but staff with patient. Choco Cervantes M.Div.

## 2021-01-05 NOTE — PROGRESS NOTES
Dr. Elizabeth Torres called unit to get update on pt's SVE and labor status. Informed of minimal change. Orders to hold Cytotec until MD can assess pt. POC is to attempt placement of COOKS Catheter and give next dose of Cytotec orally. Will give info to oncoming RN.

## 2021-01-05 NOTE — PROGRESS NOTES
Pt lying on left side. UCs not tracing. Multiple attempts to readjust. Contractions palpated intermittently.

## 2021-01-05 NOTE — L&D DELIVERY NOTE
Delivery Summary    Patient: Dean Cooper MRN: [id number]  SSN: [id number]    YOB: 1990  Age: 27 y.o. Sex: female       Information for the patient's :  Eula Mccullough [[id number]]       Labor Events:    Labor:      Steroids:     Cervical Ripening Date/Time: 2021 8:00 AM   Cervical Ripening Type: Misoprostol   Antibiotics During Labor:     Rupture Identifier:      Rupture Date/Time: 2021 7:51 AM   Rupture Type: AROM   Amniotic Fluid Volume: Oligohydramic    Amniotic Fluid Description: Clear    Amniotic Fluid Odor: None    Induction: Prostaglandins       Induction Date/Time:        Indications for Induction: Fetal Demise    Augmentation: AROM   Augmentation Date/Time: 17:51 AM   Indications for Augmentation:     Labor complications: None       Additional complications:        Delivery Events:  Indications For Episiotomy:     Episiotomy:     Perineal Laceration(s):     Repaired:     Periurethral Laceration Location:      Repaired:     Labial Laceration Location:     Repaired:     Sulcal Laceration Location:     Repaired:     Vaginal Laceration Location:     Repaired:     Cervical Laceration Location:     Repaired:     Repair Suture:     Number of Repair Packets:     Estimated Blood Loss (ml):  ml   Quantitative Blood Loss (ml)                Delivery Date: 2021    Delivery Time: 10:25 AM  Delivery Type: Vaginal, Spontaneous  Sex:      Gestational Age: 22w6d   Delivery Clinician:  Ru Bran  Living Status: Fetal Demise   Delivery Location: L&D 436          APGARS  One minute Five minutes Ten minutes   Skin color: 0   0   0     Heart rate: 0   0   0     Grimace: 0   0   0     Muscle tone: 0   0   0     Breathin   0   0     Totals: 0   0   0         Presentation: Vertex    Position:   Occiput    Resuscitation Method:  None     Meconium Stained:        Cord Information:    Complications:    Cord around:    Delayed cord clamping?     Cord clamped date/time:2021 10:25 AM  Disposition of Cord Blood: Discard    Blood Gases Sent?: No    Placenta:  Date/Time: 2021 10:27 AM  Removal: Expressed      Appearance:       [place] Measurements:  Birth Weight: 14.3 oz (0.405 kg)      Birth Length: 11.5\" (0.292 m)      Head Circumference: 7.97\" (0.202 m)      Chest Circumference: 6.5\" (0.165 m)     Abdominal Girth: Other Providers:   Holli VÁZQUEZ;SHANNON HORTON;FEI ROJAS, Obstetrician;Primary Nurse;Charge Nurse;Staff Nurse           Group B Strep:   Lab Results   Component Value Date/Time    GrBStrep, External negative 10/12/2012     Information for the patient's :  Tee Amaya [[id number]]   No results found for: ABORH, PCTABR, PCTDIG, BILI, ABORHEXT, ABORH     No results for input(s): PCO2CB, PO2CB, HCO3I, SO2I, IBD, PTEMPI, SPECTI, PHICB, ISITE, IDEV, IALLEN in the last 72 hours.

## 2021-01-05 NOTE — PROGRESS NOTES
Dr. Laura Macias called unit to get update on pt. Informed him that the pt finally decided to get the epidural. MD informed RN that he would no longer be coming to place COOKS. Plans to continue with original POC with next Cytotec due at 0000.

## 2021-01-05 NOTE — PROGRESS NOTES
Buck Garcia at bedside at [phone]. LINA Limon at bedside at 2227    Assisted pt to sitting up on bedside at 2230. Timeout completed at 65 with MD, LINA and myself at bedside. Test dose given at 2238. Negative reaction. Dose given at 2242. Pt assisted to lying back in left tilt position. See anesthesia record for details. See vital sign flow sheet for BP. Tolerated procedure well.

## 2021-01-05 NOTE — L&D DELIVERY NOTE
Present for entire delivery. Details in delivery summary. . Non-viable Female Infant, APGARS N/A. Weight 0 lbs. , 14 oz. EBL:  250 mL  Pain mgmt:   epidural    Placenta spont, intact, 3VC. No lacerations    Pt stable.         Angel Whyte MD     10:45 AM    21

## 2021-01-05 NOTE — PROGRESS NOTES
Zamudio catheter placed. Pt c/o pain on her left side with numbness on her right. Pt repositioned to lateral left side and encouraged to use PCA to get epidural to flow into left side. Pillow placed between knees. Pt instructed to call out for needs or concerns. Pts mother at bedside comforting pt.  sleeping on couch.

## 2021-01-05 NOTE — PROGRESS NOTES
Shift assessment completed per flow sheet. Pt denies complaints of LOF, vaginal bleeding,HA, epigastric pain, blurred vision or N/V. Mild contractions palpated. See flowsheet for details. POC reviewed with pt and pt verbalized understanding. Pt oriented to room and has call light within reach. Pt denies any needs at this time but will call out PRN. Pt now resting in bed with  and sister at bedside.

## 2021-01-05 NOTE — ANESTHESIA PROCEDURE NOTES
Epidural Block    Patient location during procedure: OB  Start time: 1/4/2021 10:23 PM  End time: 1/4/2021 10:27 PM  Reason for block: labor epidural  Staffing  Performed: attending   Anesthesiologist: Leslie Bates MD  Preanesthetic Checklist  Completed: patient identified, IV checked, risks and benefits discussed, monitors and equipment checked, pre-op evaluation and timeout performed  Block Placement  Patient position: sitting  Prep: ChloraPrep  Sterility prep: cap, drape, gloves, gown, hand and mask  Sedation level: no sedation  Patient monitoring: heart rate and continuous pulse oximetry  Approach: midline  Location: lumbar  Lumbar location: L3-L4  Epidural  Loss of resistance technique: saline  Guidance: landmark technique  Needle  Needle type: Tuohy   Needle gauge: 18 G  Needle length: 9 cm  Needle insertion depth: 4 cm  Catheter at skin depth: 9 cm  Catheter securement method: clear occlusive dressing, liquid medical adhesive and surgical tape  Test dose: negative  Medications Administered  Lidocaine-EPINEPHrine (XYLOCAINE) 1.5 %-1:200,000 Epidural, 3 mL  Additional Notes  Discussed the risks and benefits of epidural placement and use with patient including (but not limited to) the risk of wet tap and spinal headache, inadequate analgesia, need for removal/replacement of epidural, and hypotension. Discussed the remote risk of uncontrolled bleeding or infection requiring an operation to remedy. After the patient agreed to proceed, I ensured the patient had no contraindications to labor epidural placement including prohibitive heart defects, hypocoagulation, family or personal history of a bleeding disorder. Epidural placement is described in the block note. Frequent hemodynamic monitoring in the first 30 minutes following initial placement was performed. Patient and RN were instructed to call anytime with any questions.

## 2021-01-05 NOTE — PROGRESS NOTES
Dr. Cristobal Mack called to check on status of pt. MD would like to place a COOKS Catheter should the pt choose to get an epidural. Talked with pt and she would like a moment to discuss with her .

## 2021-01-05 NOTE — PROGRESS NOTES
Pt requested more information on COOKS Catheter. Catheter brought to her room and diagram shown to help her understand the process/need for mechanical dilation. Pt educated and after a few minutes decided to go ahead with epidural in order to get the COOKS.

## 2021-01-05 NOTE — ANESTHESIA PREPROCEDURE EVALUATION
Anesthetic History   No history of anesthetic complications            Review of Systems / Medical History  Patient summary reviewed, nursing notes reviewed and pertinent labs reviewed    Pulmonary  Within defined limits                 Neuro/Psych   Within defined limits           Cardiovascular  Within defined limits                Exercise tolerance: >4 METS     GI/Hepatic/Renal  Within defined limits              Endo/Other  Within defined limits           Other Findings              Physical Exam    Airway  Mallampati: I      Mouth opening: Normal     Cardiovascular  Regular rate and rhythm,  S1 and S2 normal,  no murmur, click, rub, or gallop  Rhythm: regular  Rate: normal      Pertinent negatives: No murmur   Dental  No notable dental hx       Pulmonary  Breath sounds clear to auscultation               Abdominal         Other Findings            Anesthetic Plan    ASA: 2  Anesthesia type: epidural      Post-op pain plan if not by surgeon: intrathecal opiates      Anesthetic plan and risks discussed with: Patient and Spouse      Fetal demise, plan is for balloon dilation as she is still closed.

## 2021-01-06 VITALS
HEIGHT: 65 IN | RESPIRATION RATE: 16 BRPM | WEIGHT: 141 LBS | HEART RATE: 60 BPM | BODY MASS INDEX: 23.49 KG/M2 | SYSTOLIC BLOOD PRESSURE: 91 MMHG | TEMPERATURE: 98 F | DIASTOLIC BLOOD PRESSURE: 53 MMHG

## 2021-01-06 LAB
BASOPHILS # BLD: 0 K/UL (ref 0–0.2)
BASOPHILS NFR BLD: 0 % (ref 0–2)
DIFFERENTIAL METHOD BLD: ABNORMAL
EOSINOPHIL # BLD: 0.1 K/UL (ref 0–0.8)
EOSINOPHIL NFR BLD: 2 % (ref 0.5–7.8)
ERYTHROCYTE [DISTWIDTH] IN BLOOD BY AUTOMATED COUNT: 15.1 % (ref 11.9–14.6)
HCT VFR BLD AUTO: 29.5 % (ref 35.8–46.3)
HGB BLD-MCNC: 9.8 G/DL (ref 11.7–15.4)
IMM GRANULOCYTES # BLD AUTO: 0 K/UL (ref 0–0.5)
IMM GRANULOCYTES NFR BLD AUTO: 0 % (ref 0–5)
LYMPHOCYTES # BLD: 1.2 K/UL (ref 0.5–4.6)
LYMPHOCYTES NFR BLD: 17 % (ref 13–44)
MCH RBC QN AUTO: 29.6 PG (ref 26.1–32.9)
MCHC RBC AUTO-ENTMCNC: 33.2 G/DL (ref 31.4–35)
MCV RBC AUTO: 89.1 FL (ref 79.6–97.8)
MONOCYTES # BLD: 0.4 K/UL (ref 0.1–1.3)
MONOCYTES NFR BLD: 6 % (ref 4–12)
NEUTS SEG # BLD: 5 K/UL (ref 1.7–8.2)
NEUTS SEG NFR BLD: 74 % (ref 43–78)
NRBC # BLD: 0 K/UL (ref 0–0.2)
PLATELET # BLD AUTO: 145 K/UL (ref 150–450)
PMV BLD AUTO: 10 FL (ref 9.4–12.3)
RBC # BLD AUTO: 3.31 M/UL (ref 4.05–5.2)
WBC # BLD AUTO: 6.7 K/UL (ref 4.3–11.1)

## 2021-01-06 PROCEDURE — 90715 TDAP VACCINE 7 YRS/> IM: CPT | Performed by: OBSTETRICS & GYNECOLOGY

## 2021-01-06 PROCEDURE — 36415 COLL VENOUS BLD VENIPUNCTURE: CPT

## 2021-01-06 PROCEDURE — 74011250636 HC RX REV CODE- 250/636: Performed by: OBSTETRICS & GYNECOLOGY

## 2021-01-06 PROCEDURE — 74011250637 HC RX REV CODE- 250/637: Performed by: OBSTETRICS & GYNECOLOGY

## 2021-01-06 PROCEDURE — 85025 COMPLETE CBC W/AUTO DIFF WBC: CPT

## 2021-01-06 RX ADMIN — TETANUS TOXOID, REDUCED DIPHTHERIA TOXOID AND ACELLULAR PERTUSSIS VACCINE, ADSORBED 0.5 ML: 5; 2.5; 8; 8; 2.5 SUSPENSION INTRAMUSCULAR at 09:15

## 2021-01-06 RX ADMIN — PRENATAL VIT W/ FE FUMARATE-FA TAB 27-0.8 MG 1 TABLET: 27-0.8 TAB at 09:15

## 2021-01-06 NOTE — PROGRESS NOTES
Morning assessment completed as noted. Pt denies c/o pain or needs. Pt holding baby in basket. Tearful and appropriate.

## 2021-01-06 NOTE — PROGRESS NOTES
Discharge instructions provided to pt and , both verbalizes understanding. Opportunities to grieve provided. Pt desires to allow baby to transfer [place] [facility] to discharge. Pt discharged ambulatory, with this RN at side; stable at time of discharge.

## 2021-01-06 NOTE — ANESTHESIA POSTPROCEDURE EVALUATION
* No procedures listed *. No value filed. Anesthesia Post Evaluation      Multimodal analgesia: multimodal analgesia used between 6 hours prior to anesthesia start to PACU discharge  Patient location during evaluation: bedside  Patient participation: complete - patient participated  Level of consciousness: awake and alert  Pain management: adequate  Airway patency: patent  Anesthetic complications: no  Cardiovascular status: acceptable  Respiratory status: nonlabored ventilation and acceptable  Hydration status: acceptable  Post anesthesia nausea and vomiting:  none      INITIAL Post-op Vital signs: No vitals data found for the desired time range.

## 2021-01-07 NOTE — DISCHARGE SUMMARY
Date of Admission:  2021  7:02 AM  Date of Discharge:  2021 11:32 AM    Encounter Diagnoses   Name Primary?  Fetal demise > 22 weeks, delivered, current hospitalization Yes        [address] [address]1949 y.o. 12 [address] presented at 22w6d for induction for IUFD. Pt had non-viable  without incident. See delivery note for all delivery details. Pt's PP course was uneventful. On day of D/C, she was ambulating well, afebrile, with lochia < menses. She was discharged home with medications as below. Routine PP instructions given to patient. She is to follow up with [facility] in 1 week for recheck. Discharge Medication List as of 2021  9:30 AM      START taking these medications    Details   zolpidem (Ambien) 5 mg tablet Take 1 Tab by mouth nightly as needed for Sleep. Max Daily Amount: 5 mg., Print, Disp-14 Tab, R-0      HYDROcodone-acetaminophen (Norco) 5-325 mg per tablet Take 1 Tab by mouth every six (6) hours as needed for Pain for up to 5 days. Max Daily Amount: 4 Tabs., Print, Disp-18 Tab, R-0      LORazepam (Ativan) 1 mg tablet Take 1 Tab by mouth two (2) times daily as needed for Anxiety. Max Daily Amount: 2 mg., Print, Disp-14 Tab, R-0         CONTINUE these medications which have NOT CHANGED    Details   acetaminophen (Tylenol Extra Strength) 500 mg tablet Take 1,000 mg by mouth every six (6) hours as needed for Pain., Historical Med      docusate sodium (COLACE) 100 mg capsule Take 1 Cap by mouth two (2) times a day for 90 days. , Normal, Disp-60 Cap,R-2      prenatal vit-iron fumarate-fa 27 mg iron- 0.8 mg tab tablet Take 1 Tab by mouth daily. , Normal, Disp-90 Tab, R-3         STOP taking these medications       valACYclovir (Valtrex) 500 mg tablet Comments:   Reason for Stopping:               Pooja Marcus MD  1:36 PM  21

## 2021-01-11 ENCOUNTER — HOSPITAL ENCOUNTER (OUTPATIENT)
Age: 31
Discharge: HOME | End: 2021-01-11
Attending: OBSTETRICS & GYNECOLOGY | Admitting: OBSTETRICS & GYNECOLOGY
Payer: MEDICAID

## 2021-01-11 VITALS
DIASTOLIC BLOOD PRESSURE: 58 MMHG | RESPIRATION RATE: 16 BRPM | HEART RATE: 78 BPM | TEMPERATURE: 98 F | SYSTOLIC BLOOD PRESSURE: 107 MMHG

## 2021-01-11 PROCEDURE — 76815 OB US LIMITED FETUS(S): CPT

## 2021-01-11 PROCEDURE — 99281 EMR DPT VST MAYX REQ PHY/QHP: CPT

## 2021-01-11 RX ORDER — MISOPROSTOL 100 UG/1
200 TABLET ORAL EVERY 6 HOURS
Qty: 3 TAB | Refills: 0 | Status: SHIPPED | OUTPATIENT
Start: 2021-01-11 | End: 2021-01-12

## 2021-01-11 RX ORDER — HYDROCODONE BITARTRATE AND ACETAMINOPHEN 5; 325 MG/1; MG/1
1 TABLET ORAL
Qty: 12 TAB | Refills: 0 | Status: SHIPPED | OUTPATIENT
Start: 2021-01-11 | End: 2021-01-12

## 2021-01-11 NOTE — DISCHARGE INSTRUCTIONS
Patient Education        Postpartum Bleeding and Retained Placenta: Care Instructions  Your Care Instructions  The placenta forms during pregnancy to give your baby nutrients and oxygen. It also removes waste products. Normally, the placenta attaches to the top part of the uterus. Then it comes out of your body after the birth. But sometimes, the placenta does not come out after the birth. This is called a retained placenta. When this happens, your doctor will remove the placenta. Follow-up care is a key part of your treatment and safety. Be sure to make and go to all appointments, and call your doctor if you are having problems. It's also a good idea to know your test results and keep a list of the medicines you take. How can you care for yourself at home? · Rest until you feel better. · After the birth, you will have a bloody discharge from the vagina. In less than a week, it will be pink in color. After about 10 days, it will be white or yellow. It may last for 2 to 4 weeks or longer, until your uterus heals. Don't worry if you also pass some blood clots, as long as they are smaller than a golf ball. · If you have a tear or stitches in your vaginal area, change your pad at least every 4 hours. This will prevent soreness and infection. Do not use tampons until you see your doctor for your 6-week checkup. · It's normal to have cramps for the first few days after the birth. They happen because the uterus is going back to normal size. Take an over-the-counter pain medicine, such as acetaminophen (Tylenol) or ibuprofen (Advil, Motrin). Read and follow all instructions on the label. Do not take aspirin. It can cause more bleeding. · Do not take two or more pain medicines at the same time unless the doctor told you to. Many pain medicines have acetaminophen, which is Tylenol. Too much acetaminophen (Tylenol) can be harmful. When should you call for help?    Call 911 anytime you think you may need emergency care. For example, call if:    · You passed out (lost consciousness). Call your doctor now or seek immediate medical care if:    · You have severe vaginal bleeding.     · You are dizzy or lightheaded, or you feel like you may faint.     · You have a fever.     · You have new or worse pain in your belly or pelvis. Watch closely for changes in your health, and be sure to contact your doctor if:    · Your vaginal bleeding seems to be getting heavier.     · You have new or worse vaginal discharge.     · You feel sad, anxious, or hopeless for more than a few days.     · You do not get better as expected. Where can you learn more? Go to http://www.gray.com/  Enter J072 in the search box to learn more about \"Postpartum Bleeding and Retained Placenta: Care Instructions. \"  Current as of: February 11, 2020               Content Version: 12.6  © 9690-1241 [employer]. Care instructions adapted under license by [employer] (which disclaims liability or warranty for this information). If you have questions about a medical condition or this instruction, always ask your healthcare professional. [address] any warranty or liability for your use of this information.

## 2021-01-11 NOTE — PROGRESS NOTES
Pt presents with complaints of something coming out of my vagina.  Dr James Shah in room to check pt   8532 vag exam with speculum see Dr notes

## 2021-01-12 PROBLEM — O26.899 RH NEGATIVE STATE IN ANTEPARTUM PERIOD: Status: RESOLVED | Noted: 2020-11-13 | Resolved: 2021-01-12

## 2021-01-12 PROBLEM — Z67.91 RH NEGATIVE STATE IN ANTEPARTUM PERIOD: Status: RESOLVED | Noted: 2020-11-13 | Resolved: 2021-01-12

## 2021-01-12 PROBLEM — O99.012 ANEMIA AFFECTING PREGNANCY IN SECOND TRIMESTER: Status: RESOLVED | Noted: 2020-11-19 | Resolved: 2021-01-12

## 2021-01-12 PROBLEM — O35.3XX0: Status: RESOLVED | Noted: 2020-12-11 | Resolved: 2021-01-12

## 2021-01-12 PROBLEM — O09.92 HIGH-RISK PREGNANCY IN SECOND TRIMESTER: Status: RESOLVED | Noted: 2020-11-13 | Resolved: 2021-01-12

## 2021-01-12 PROBLEM — O34.219 PREVIOUS CESAREAN DELIVERY, ANTEPARTUM CONDITION OR COMPLICATION: Status: RESOLVED | Noted: 2020-11-13 | Resolved: 2021-01-12

## 2021-01-12 PROBLEM — O36.8130 DECREASED FETAL MOVEMENT AFFECTING MANAGEMENT OF PREGNANCY IN THIRD TRIMESTER: Status: RESOLVED | Noted: 2021-01-03 | Resolved: 2021-01-12

## 2021-01-12 PROBLEM — O36.4XX1 FETAL DEMISE, GREATER THAN 22 WEEKS, ANTEPARTUM, FETUS 1: Status: RESOLVED | Noted: 2021-01-03 | Resolved: 2021-01-12

## 2023-01-13 NOTE — ED PROVIDER NOTES
Chief Complaint: passing tissue from vagina      27 y.o. female s/p vaginal delivery of 22 week fetal demise on 1/5/21. Pt had uncomplicated delivery of demise and of placenta. Today she began passing something from her vagina. Having some very mild cramping. No fever. No bleeding. No significant pain. HISTORY:    Social History     Substance and Sexual Activity   Sexual Activity Yes    Partners: Male    Birth control/protection: None     Patient's last menstrual period was 07/29/2020 (exact date).     Social History     Socioeconomic History    Marital status:      Spouse name: Not on file    Number of children: 2    Years of education: Not on file    Highest education level: Not on file   Occupational History    Not on file   Social Needs    Financial resource strain: Not on file    Food insecurity     Worry: Not on file     Inability: Not on file   [employer] needs     Medical: Not on file     Non-medical: Not on file   Tobacco Use    Smoking status: Never Smoker    Smokeless tobacco: Never Used   Substance and Sexual Activity    Alcohol use: No    Drug use: No     Comment: hx marajuan use    Sexual activity: Yes     Partners: Male     Birth control/protection: None   Lifestyle    Physical activity     Days per week: Not on file     Minutes per session: Not on file    Stress: Not on file   Relationships    Social connections     Talks on phone: Not on file     Gets together: Not on file     Attends Faith service: Not on file     Active member of club or organization: Not on file     Attends meetings of clubs or organizations: Not on file     Relationship status: Not on file    Intimate partner violence     Fear of current or ex partner: Not on file     Emotionally abused: Not on file     Physically abused: Not on file     Forced sexual activity: Not on file   Other Topics Concern     Service Not Asked    Blood Transfusions Not Asked    Caffeine Concern No  Occupational Exposure Not Asked   Tim Sermons Hazards Not Asked    Sleep Concern Not Asked    Stress Concern Not Asked    Weight Concern Not Asked    Special Diet Not Asked    Back Care Not Asked    Exercise No    Bike Helmet Not Asked    Seat Belt Yes    Self-Exams No   Social History Narrative    Abuse: Feels safe at home, no history of physical abuse, no history of sexual abuse       Past Surgical History:   Procedure Laterality Date    HX BREAST AUGMENTATION      HX  SECTION      HX OTHER SURGICAL  2006    jaw surgery    MN BREAST SURGERY PROCEDURE UNLISTED      MN  DELIVERY ONLY  ,     [facility]        Past Medical History:   Diagnosis Date    Anemia NEC          ROS:  A 12 point review of symptoms negative except for chief complaint as described above. PHYSICAL EXAM:  Visit Vitals  BP (!) 107/58   Pulse 78   Temp 98 °F (36.7 °C)   Resp 16   LMP 2020 (Exact Date)       Constitutional: The patient appears well, alert, oriented x 3. Cardiovascular: Heart RRR, no murmurs. Respiratory: Lungs clear, no respiratory distress  GI: Abdomen soft, nontender, no guarding  No fundal tenderness  Musculoskeletal: no cva tenderness    Skin: no rashes or lesions  Psychiatric:Mood/ Affect: appropriate  Vaginal exam- pt passed approx 5cm X 3 cm mass of tissue on arrival to triage  Spec exam- tissue at os- teased out with ring forcep without difficulty, no bleeding, no pain  bimanual exam- no uterine tenderness  Bedside ultrasound 2 X 4 cm thickened endometrium    I personally reviewed pt's medical record including relevant labs       Assessment/Plan:  26 yo  s/p vag delivery of fetal demise 6 days ago with uncomplicated delivery of placenta  Today with retained products that passed without difficulty  On ultrasound- thickened endometrium suspicious for small amount of more retained products   Discussed with pt options to include D&C or misoprostol. Pt would like misoprostol. She already has an appt with her regular ob in am  No fever or evidence of endometritis.   rx misoprostol 200mg every 6 hours X 3    Level of care c/w medical decision making Finasteride Pregnancy And Lactation Text: This medication is absolutely contraindicated during pregnancy. It is unknown if it is excreted in breast milk.

## (undated) DEVICE — SURGICAL PROCEDURE PACK C SECT CDS

## (undated) DEVICE — SUTURE VCRL RAPIDE SZ 3-0 L36IN ABSRB UD L36MM CT-1 1/2 CIR VR944

## (undated) DEVICE — AMD ANTIMICROBIAL GAUZE SPONGES,12 PLY USP TYPE VII, 0.2% POLYHEXAMETHYLENE BIGUANIDE HCI (PHMB): Brand: CURITY

## (undated) DEVICE — MEDI-VAC NON-CONDUCTIVE SUCTION TUBING: Brand: CARDINAL HEALTH

## (undated) DEVICE — PREMIUM WET SKIN PREP TRAY: Brand: MEDLINE INDUSTRIES, INC.

## (undated) DEVICE — Device: Brand: PORTEX

## (undated) DEVICE — REM POLYHESIVE ADULT PATIENT RETURN ELECTRODE: Brand: VALLEYLAB

## (undated) DEVICE — SUTURE MCRYL SZ 3-0 L27IN ABSRB UD L60MM KS STR REV CUT Y523H

## (undated) DEVICE — SUTURE VCRL SZ 0 L36IN ABSRB UD L36MM CT-1 1/2 CIR J946H

## (undated) DEVICE — SUTURE MCRYL SZ 4-0 L27IN ABSRB UD L19MM PS-2 1/2 CIR PRIM Y426H

## (undated) DEVICE — KENDALL SCD EXPRESS SLEEVES, KNEE LENGTH, MEDIUM: Brand: KENDALL SCD

## (undated) DEVICE — (D)STRIP SKN CLSR 0.5X4IN WHT --

## (undated) DEVICE — STERILE POLYISOPRENE POWDER-FREE SURGICAL GLOVES: Brand: PROTEXIS

## (undated) DEVICE — TRAY CATH 16FR PVC INTMIT STR TIP PREATTACH TO 1000ML COLL

## (undated) DEVICE — AMD ANTIMICROBIAL NON-ADHERENT PAD,0.2% POLYHEXAMETHYLENE BIGUANIDE HCI (PHMB): Brand: TELFA

## (undated) DEVICE — SOLUTION IV 1000ML 0.9% SOD CHL

## (undated) DEVICE — PENCIL ES L3M BTTN SWCH S STL HEX LOK BLDE ELECTRD HOLSTER

## (undated) DEVICE — SOLUTION IRRIG 1000ML H2O STRL BLT

## (undated) DEVICE — SUTURE PDS II SZ 0 L27IN ABSRB VLT L36MM CT-1 1/2 CIR Z340H